# Patient Record
Sex: FEMALE | NOT HISPANIC OR LATINO | Employment: FULL TIME | URBAN - METROPOLITAN AREA
[De-identification: names, ages, dates, MRNs, and addresses within clinical notes are randomized per-mention and may not be internally consistent; named-entity substitution may affect disease eponyms.]

---

## 2017-01-20 ENCOUNTER — OFFICE VISIT (OUTPATIENT)
Dept: OBGYN | Facility: CLINIC | Age: 42
End: 2017-01-20
Payer: COMMERCIAL

## 2017-01-20 VITALS
SYSTOLIC BLOOD PRESSURE: 116 MMHG | HEIGHT: 61 IN | HEART RATE: 96 BPM | WEIGHT: 143 LBS | BODY MASS INDEX: 27 KG/M2 | OXYGEN SATURATION: 97 % | DIASTOLIC BLOOD PRESSURE: 74 MMHG

## 2017-01-20 DIAGNOSIS — Z98.890 POSTOPERATIVE STATE: Primary | ICD-10-CM

## 2017-01-20 PROCEDURE — 99024 POSTOP FOLLOW-UP VISIT: CPT | Performed by: OBSTETRICS & GYNECOLOGY

## 2017-01-20 NOTE — NURSING NOTE
Faxed return to work form to [employer]. Copy saved for patient.Copy sent to omer.  TK Horowitz 1/20/2017

## 2017-01-20 NOTE — NURSING NOTE
"Chief Complaint   Patient presents with     Surgical Followup     6 week post op       Initial /74 mmHg  Pulse 96  Ht 5' 1\" (1.549 m)  Wt 143 lb (64.864 kg)  BMI 27.03 kg/m2  SpO2 97%  LMP 11/10/2016  Breastfeeding? No Estimated body mass index is 27.03 kg/(m^2) as calculated from the following:    Height as of this encounter: 5' 1\" (1.549 m).    Weight as of this encounter: 143 lb (64.864 kg).  BP completed using cuff size: prachi Horowitz MA 1/20/2017         "

## 2017-01-22 ENCOUNTER — TELEPHONE (OUTPATIENT)
Dept: NURSING | Facility: CLINIC | Age: 42
End: 2017-01-22

## 2017-01-22 NOTE — TELEPHONE ENCOUNTER
"Call Type: Triage Call    Presenting Problem: \" I had a hysterectomy  with Dr. Maria and had  a followup appointment. I am having vaginal bleeding. He said to  page him if I have bleeding.  I also am having a stomach virus, with  fever, and diarrhea. \" Paged Dr. Shruthi Torres for [facility]  at 11:55AM through [employer] directly to patient at [phone].  Triage Note:  Guideline Title: Postoperative Problems ; Vaginal Bleeding, Menopausal:  No Menopausal Hormone Therapy (MHT)  Recommended Disposition: Call Provider Immediately  Original Inclination: Wanted to speak with a nurse  Override Disposition:  Intended Action: Call PCP/HCP  Physician Contacted: Yes  More bleeding from site of instrumentation or procedure OR bleeding lasting longer  than defined in provider specified discharge information ?  YES  Signs of dehydration ? NO  Unconscious now ? NO  Abdominal bloating ? NO  New onset OR worsening bleeding from incision requiring pressure to control ? NO  Depression and no other symptoms ? NO  Severe breathing problems ? NO  Wound separation AND internal organs protrude through wound or surgical incision  (evisceration) ? NO  Hives /Urticaria /Rash ? NO  New or worsening signs and symptoms that may indicate shock ? NO  Neck lump/swelling ? NO  Coughing up large amount of obvious blood (not blood-streaked sputum) ? NO  Orthopedic hardware (metal plate, richard or screw) newly bulging under or through  skin ? NO  New seizure now or within last 6 hours ? NO  Continuous or heavy bleeding from operative site and NOT controlled with 10  minutes of steady pressure ? NO  Pain not relieved by pain medication when taken as directed ? NO  Passing red, black or tarry material from rectum AND onset of new signs and  symptoms of hypovolemia ? NO  Wearing cast or splint AND new or worsening pain, swelling, numbness, tingling,  coolness or change in color that is NOT improved by elevation for 30 minutes OR  not resolved " within 2 hours ? NO  Temperature of 101.5 F (38.6 C) or greater that has not responded to 24 hours of  home care measures ? NO  Vomiting red, bloody or coffee-ground material, more than streaks of blood or  scant amount (not following nosebleed within past day) ? NO  New onset severe pain and pale, discolored or cool below the surgical site  compared to the other extremity ? NO  Current or recent urinary tract instrumentation AND urinary tract symptoms OR no  urine flow ? NO  New or worsening breathing problems ? NO  Heavy vaginal bleeding (soaking 1 pad/tampon every hour for 2 hours or more) ? NO  Urinary tract symptoms AND any flank or low back pain ? NO  Chest discomfort associated with shortness of breath, sweating, odd heartbeats or  different heart rate, nausea, vomiting, lightheadedness, or fainting lasting 5 or  more minutes now or within the last hour ? NO  Chest pain spreading to the shoulders, neck, jaw, in one or both arms, stomach or  back lasting 5 or more minutes now or within the last hour. Pain is NOT  associated with taking a deep breath or a productive cough, movement, or touch to  a localized area. ? NO  Signs and symptoms of anaphylaxis ? NO  Questions or concerns about postoperative wound ? NO  Headache following spinal anesthesia AND not relieved by pain medication ? NO  Pressure, fullness, squeezing sensation or pain anywhere in the chest lasting 5 or  more minutes now or within the last hour. Pain is NOT associated with taking a  deep breath or a productive cough, movement, or touch to a localized area on the  chest. ? NO  Sudden onset of focal neurological changes (difficulty speaking, numbness,  weakness, paralysis, loss of coordination, or change in vision such as double  vision or loss of visual field) ? NO  Sudden onset of shortness of breath, chest pain and cough with blood tinged sputum  ? NO  No urination for 12 or more hours ? NO  Abdominal pain, any blood in vomitus or stool,  abdominal bloating, new fever or  other cautionary symptoms began after GI surgery or procedure and is lasting  longer than defined in provider specified discharge information. ? NO  Abdominal pain, any blood in vomitus or stool, abdominal bloating, new fever or  other cautionary symptoms began after GI surgery or procedure and is lasting  longer than defined in provider specified discharge information. ? NO  New onset of unbearable pain within last 24 hours ? NO  Any temperature elevation in an immunocompromised individual OR frail elderly with  signs of dehydration ? NO  Unable to retain food or fluids for 24 hours or more due to recurrent vomiting ? NO  Any other unexpected urinary symptoms following urinary tract or abdominal surgery  within timeframe specified by provider ? NO  Sexual Assault or Rape ? NO  Taking hormone replacement therapy ? NO  New or worsening signs and symptoms that may indicate shock ? NO  Profuse vaginal bleeding not contained by pads or tampons ? NO  Physician Instructions: Dr. Crystal Torres paged directly to  patient at [phone]  Care Advice: See another provider immediately if unable to talk with your  provider within 1 hour. Consider the closest urgent care or ED if an office  or clinic is not available. Another adult should drive.

## 2017-01-23 ENCOUNTER — OFFICE VISIT (OUTPATIENT)
Dept: FAMILY MEDICINE | Facility: CLINIC | Age: 42
End: 2017-01-23
Payer: COMMERCIAL

## 2017-01-23 VITALS
HEART RATE: 80 BPM | OXYGEN SATURATION: 99 % | WEIGHT: 143 LBS | DIASTOLIC BLOOD PRESSURE: 72 MMHG | BODY MASS INDEX: 27 KG/M2 | HEIGHT: 61 IN | TEMPERATURE: 97.3 F | SYSTOLIC BLOOD PRESSURE: 130 MMHG

## 2017-01-23 DIAGNOSIS — R11.10 VOMITING, INTRACTABILITY OF VOMITING NOT SPECIFIED, PRESENCE OF NAUSEA NOT SPECIFIED, UNSPECIFIED VOMITING TYPE: ICD-10-CM

## 2017-01-23 DIAGNOSIS — J98.01 ACUTE BRONCHOSPASM: ICD-10-CM

## 2017-01-23 DIAGNOSIS — R19.5 LOOSE STOOLS: ICD-10-CM

## 2017-01-23 DIAGNOSIS — R07.0 THROAT PAIN: ICD-10-CM

## 2017-01-23 DIAGNOSIS — R05.9 COUGH: Primary | ICD-10-CM

## 2017-01-23 LAB
FLUAV+FLUBV AG SPEC QL: NORMAL
FLUAV+FLUBV AG SPEC QL: NORMAL
SPECIMEN SOURCE: NORMAL

## 2017-01-23 PROCEDURE — 87804 INFLUENZA ASSAY W/OPTIC: CPT | Performed by: FAMILY MEDICINE

## 2017-01-23 PROCEDURE — 99214 OFFICE O/P EST MOD 30 MIN: CPT | Performed by: FAMILY MEDICINE

## 2017-01-23 RX ORDER — ONDANSETRON 8 MG/1
8 TABLET, FILM COATED ORAL EVERY 12 HOURS PRN
Qty: 9 TABLET | Refills: 0 | COMMUNITY
Start: 2017-01-23 | End: 2017-07-28

## 2017-01-23 RX ORDER — PREDNISONE 20 MG/1
40 TABLET ORAL DAILY
Qty: 10 TABLET | Refills: 0 | COMMUNITY
Start: 2017-01-23 | End: 2017-07-28

## 2017-01-23 RX ORDER — CODEINE PHOSPHATE AND GUAIFENESIN 10; 100 MG/5ML; MG/5ML
2 SOLUTION ORAL EVERY 6 HOURS PRN
Qty: 180 ML | Refills: 0 | Status: SHIPPED | OUTPATIENT
Start: 2017-01-23 | End: 2017-07-28

## 2017-01-23 NOTE — PROGRESS NOTES
SUBJECTIVE:                                                    Di Higgins is a 41 year old female who presents to clinic today for the following health issues:    ENT Symptoms             Symptoms: cc Present Absent Comment   Fever/Chills  x     Fatigue   x    Muscle Aches  x     Eye Irritation   x    Sneezing   x    Nasal Marc/Drg   x    Sinus Pressure/Pain   x    Loss of smell   x    Dental pain   x    Sore Throat  x     Swollen Glands   x    Ear Pain/Fullness   x    Cough  x     Wheeze  x     Chest Pain  x     Shortness of breath  x     Rash       Other         Symptom duration:  5 days   Symptom severity:  severe   Treatments tried:  tea,inhaler nebs,   Contacts:  daughter,chad and son       Diarrhea     Onset: Saturday     Description:   Consistency of stool: watery and loose  Blood in stool: no   Number of loose stools in past 24 hours: 8    Progression of Symptoms:  improving    Accompanying Signs & Symptoms:  Fever: no   Nausea or vomiting; YES  Abdominal pain: YES  Episodes of constipation: no   Weight loss: no    History:   Ill contacts: no   Recent use of antibiotics: no    Recent travels: no          Recent medication-new or changes(Rx or OTC): no     Precipitating factors:   no    Alleviating factors:   nothing       Therapies Tried and outcome:  none; Outcome: no    Cough, sore throat, vomiting and lower abdominal pain.  Vomits from cough. No fever has chills.  and daughter sick; treated in UC was treated for bronchitis.    Problem list and histories reviewed & adjusted, as indicated.  Additional history: as documented    Patient Active Problem List   Diagnosis     GERD (gastroesophageal reflux disease)     Seasonal allergic rhinitis     Allergic rhinitis due to animal dander     House dust mite allergy     Rhinitis, allergic to other allergen     Vitamin D deficiency     Moderate persistent asthma     CARDIOVASCULAR SCREENING; LDL GOAL LESS THAN 160     Diagnostic skin and  "sensitization tests     Nonintractable paroxysmal hemicrania     Past Surgical History   Procedure Laterality Date     Tubal ligation  1999     laparoscopic tubal     Hysterectomy, vaginal  12/09/2016     uterine leiomyoma, adenomyosis     Hysterectomy, pap no longer indicated  12/09/2016       Social History   Substance Use Topics     Smoking status: Former Smoker     Smokeless tobacco: Former User     Quit date: 01/02/1985     Alcohol Use: Yes      Comment: occasional     Family History   Problem Relation Age of Onset     Neurologic Disorder Sister      MS     Asthma Father          ROS:  Constitutional, cardiovascular, pulmonary and gu systems are negative, except as otherwise noted.    OBJECTIVE:                                                    /72 mmHg  Pulse 80  Temp(Src) 97.3  F (36.3  C)  Ht 5' 1\" (1.549 m)  Wt 143 lb (64.864 kg)  BMI 27.03 kg/m2  SpO2 99%  LMP 11/10/2016  Body mass index is 27.03 kg/(m^2).  GENERAL: sick looking, alert and in moderate distress  EYES: Eyes grossly normal to inspection, PERRL and conjunctivae and sclerae normal  HENT: ear canals and TM's normal, nose and mouth without ulcers or lesions  NECK: no adenopathy and thyroid normal to palpation  RESP: lungs clear to auscultation - no rales, rhonchi or wheezes  CV: regular rate and rhythm, no murmur, click or rub  ABDOMEN: soft, vague lower abdominal tenderness, no hepatosplenomegaly, no masses and bowel sounds normal  MS: no gross musculoskeletal defects noted, no edema   Lab:  Results for orders placed or performed in visit on 01/23/17   Influenza A/B antigen   Result Value Ref Range    Influenza A/B Agn Specimen Nasal     Influenza A  NEG     Negative   Test results must be correlated with clinical data. If necessary, results   should be confirmed by a molecular assay or viral culture.      Influenza B  NEG     Negative   Test results must be correlated with clinical data. If necessary, results   should be confirmed " by a molecular assay or viral culture.            ASSESSMENT/PLAN:                                                    (R05) Cough  (primary encounter diagnosis)  Comment: Flu like symptoms. Influenza negative. Symptomatic treatment.  Plan: Influenza A/B antigen, guaiFENesin-codeine         (ROBITUSSIN AC) 100-10 MG/5ML SOLN solution    (R11.10) Vomiting, intractability of vomiting not specified, presence of nausea not specified, unspecified vomiting type  Comment: Stomach flu. Good hydration.  Plan: Influenza A/B antigen    (R19.5) Loose stools  Comment: Viral enteritis  Plan: Hydration    (R07.0) Throat pain  Comment: Symptomatic treatment.  Plan: Phenol 1.4 % AERS          (J98.01) Acute bronchospasm  Comment: Asthma flare due to viral illness. Steroid burst  Plan: predniSONE (DELTASONE) 20 MG tablet       Call or return to clinic prn if these symptoms worsen or fail to improve as anticipated 72 hours.       Amilcar Acosta MD  [facility]

## 2017-01-23 NOTE — MR AVS SNAPSHOT
After Visit Summary   1/23/2017    Di Higgins    MRN: [id number]           Patient Information     Date Of Birth          1975        Visit Information        Provider Department      1/23/2017 7:40 AM Amilcar Acosta MD [facility]        Today's Diagnoses     Cough    -  1     Vomiting, intractability of vomiting not specified, presence of nausea not specified, unspecified vomiting type         Loose stools         Throat pain         Acute bronchospasm           Care Instructions    [facility]    If you have any questions regarding to your visit please contact your care team:       Team Purple:   Clinic Hours Telephone Number   [address] Dr.   7am-7pm  Monday - Thursday   7am-5pm  Fridays  [phone]  (Appointment scheduling available 24/7)    Questions about your Visit?   Team Line:  [phone]   Urgent Care - [place] - 11am-9pm Monday-Friday Saturday-Sunday- 9am-5pm   [place] - 5pm-9pm Monday-Friday Saturday-Sunday- 9am-5pm  [phone] - [facility]  [phone] - [place]       What options do I have for visits at the clinic other than the traditional office visit?  To expand how we care for you, many of our providers are utilizing electronic visits (e-visits) and telephone visits, when medically appropriate, for interactions with their patients rather than a visit in the clinic.   We also offer nurse visits for many medical concerns. Just like any other service, we will bill your insurance company for this type of visit based on time spent on the phone with your provider. Not all insurance companies cover these visits. Please check with your medical insurance if this type of visit is covered. You will be responsible for any charges that are not paid by your insurance.      E-visits via [employer]:  generally incur a $35.00 fee.  Telephone visits:  Time  spent on the phone: *charged based on time that is spent on the phone in increments of 10 minutes. Estimated cost:   5-10 mins $30.00   11-20 mins. $59.00   21-30 mins. $85.00     Use [employer] (secure email communication and access to your chart) to send your primary care provider a message or make an appointment. Ask someone on your Team how to sign up for [employer].  For a Price Quote for your services, please call our [employer] Line at [phone].  As always, Thank you for trusting us with your health care needs!    Discharged By: An          Follow-ups after your visit        Follow-up notes from your care team     Return in about 1 day (around 1/24/2017), or if symptoms worsen or fail to improve.      Who to contact     If you have questions or need follow up information about today's clinic visit or your schedule please contact [facility] directly at [phone].  Normal or non-critical lab and imaging results will be communicated to you by [employer], letter or phone within 4 business days after the clinic has received the results. If you do not hear from us within 7 days, please contact the clinic through [employer] or phone. If you have a critical or abnormal lab result, we will notify you by phone as soon as possible.  Submit refill requests through [employer] or call your pharmacy and they will forward the refill request to us. Please allow 3 business days for your refill to be completed.          Additional Information About Your Visit        [employer] Information     [employer] gives you secure access to your electronic health record. If you see a primary care provider, you can also send messages to your care team and make appointments. If you have questions, please call your primary care clinic.  If you do not have a primary care provider, please call [phone] and they will assist you.        Care EveryWhere ID     This is your Care EveryWhere ID. This could be used by other organizations to  "access your [facility] medical records  [id number]        Your Vitals Were     Pulse Temperature Height BMI (Body Mass Index) Pulse Oximetry Last Period    80 97.3  F (36.3  C) 5' 1\" (1.549 m) 27.03 kg/m2 99% 11/10/2016       Blood Pressure from Last 3 Encounters:   01/23/17 130/72   01/20/17 116/74   12/30/16 137/86    Weight from Last 3 Encounters:   01/23/17 143 lb (64.864 kg)   01/20/17 143 lb (64.864 kg)   12/30/16 144 lb 3.2 oz (65.409 kg)              We Performed the Following     Influenza A/B antigen          Today's Medication Changes          These changes are accurate as of: 1/23/17  9:20 AM.  If you have any questions, ask your nurse or doctor.               Start taking these medicines.        Dose/Directions    guaiFENesin-codeine 100-10 MG/5ML Soln solution   Commonly known as:  ROBITUSSIN AC   Used for:  Cough   Started by:  Amilcar Acosta MD        Dose:  2 tsp.   Take 10 mLs by mouth every 6 hours as needed for cough   Quantity:  180 mL   Refills:  0            Where to get your medicines      Some of these will need a paper prescription and others can be bought over the counter.  Ask your nurse if you have questions.     Bring a paper prescription for each of these medications    - guaiFENesin-codeine 100-10 MG/5ML Soln solution             Primary Care Provider Office Phone # Fax #    Amilcar Acosta [phone]       [facility] [address] [place] [id number]        Thank you!     Thank you for choosing [facility]  for your care. Our goal is always to provide you with excellent care. Hearing back from our patients is one way we can continue to improve our services. Please take a few minutes to complete the written survey that you may receive in the mail after your visit with us. Thank you!             Your Updated Medication List - Protect others around you: Learn how to safely use, store and throw away your medicines at " www.disposemymeds.org.          This list is accurate as of: 1/23/17  9:20 AM.  Always use your most recent med list.                   Brand Name Dispense Instructions for use    * albuterol (2.5 MG/3ML) 0.083% neb solution     1 Box    Take 1 vial (2.5 mg) by nebulization every 6 hours as needed for shortness of breath / dyspnea or wheezing       * albuterol 108 (90 BASE) MCG/ACT Inhaler    VENTOLIN HFA    2 Inhaler    Inhale 2 puffs into the lungs every 4 hours as needed       guaiFENesin-codeine 100-10 MG/5ML Soln solution    ROBITUSSIN AC    180 mL    Take 10 mLs by mouth every 6 hours as needed for cough       ibuprofen 600 MG tablet    ADVIL/MOTRIN    30 tablet    Take 1 tablet (600 mg) by mouth every 6 hours as needed for moderate pain       * ondansetron 8 MG tablet    ZOFRAN    12 tablet    Take 1 tablet (8 mg) by mouth every 8 hours as needed for nausea       * ondansetron 8 MG tablet    ZOFRAN    9 tablet    Take 1 tablet (8 mg) by mouth every 12 hours as needed for nausea       order for DME     1 Device    Equipment being ordered: Nebulizer       Phenol 1.4 % Aers     1 Bottle    Take 2 sprays by mouth 2 times daily       * predniSONE 20 MG tablet    DELTASONE    20 tablet    Take 3 tabs (60 mg) orally daily for 3 days, 2 tabs (40 mg) orally daily for 3 days, 1 tab (20 mg) orally daily for 3 days, then 1/2 tab (10 mg) orally for 3 days       * predniSONE 20 MG tablet    DELTASONE    10 tablet    Take 2 tablets (40 mg) by mouth daily       SINUS RINSE BOTTLE KIT NA      Spray 1 Bottle in nostril daily as needed.       SUMAtriptan 50 MG tablet    IMITREX    10 tablet    Take 1 tablet (50 mg) by mouth at onset of headache for migraine May repeat in 2 hours if needed: max 2/day       * Notice:  This list has 6 medication(s) that are the same as other medications prescribed for you. Read the directions carefully, and ask your doctor or other care provider to review them with you.

## 2017-01-23 NOTE — NURSING NOTE
"Chief Complaint   Patient presents with     URI     Diarrhea       Initial /72 mmHg  Pulse 80  Temp(Src) 97.3  F (36.3  C)  Ht 5' 1\" (1.549 m)  Wt 143 lb (64.864 kg)  BMI 27.03 kg/m2  SpO2 99%  LMP 11/10/2016 Estimated body mass index is 27.03 kg/(m^2) as calculated from the following:    Height as of this encounter: 5' 1\" (1.549 m).    Weight as of this encounter: 143 lb (64.864 kg).  BP completed using cuff size: prachi Sanchez MA      "

## 2017-01-23 NOTE — PROGRESS NOTES
"Di is a 41 year old , She is 6 weeks s/p TVH.  Her postop recovery has been uncomplicated.  She is currently requiring no medications for pain management.  no vaginal bleeding, no hot flashes. Energy level is normal.  Denies fever, chills.    The pathology report showed:   Uterus and cervix with bilateral fallopian tubes, hysterectomy  and salpingectomy - Adenomyosis and leiomyomata.    The medical history, social history and family history have been reviewed and updated as indicated.      ROS:   ROS:4 point ROS including Respiratory, CV, GI and , other than that noted in the HPI and the PMH, is negative     PE: /74 mmHg  Pulse 96  Ht 5' 1\" (1.549 m)  Wt 143 lb (64.864 kg)  BMI 27.03 kg/m2  SpO2 97%  LMP 11/10/2016  Breastfeeding? No  HEENT:  NC/AT, EOMI  Abd: soft, non tender, no masses  Vulva:  No masses noted  Vagina: cuff healing, some sutures still seen  BME: no masses    ASSESSMENT:  Post op TVH    PLAN:  Return to routine care    Aquiles Maria MD        "

## 2017-01-23 NOTE — PATIENT INSTRUCTIONS
[facility]    If you have any questions regarding to your visit please contact your care team:       Team Purple:   Clinic Hours Telephone Number   [address] Dr.   7am-7pm  Monday - Thursday   7am-5pm  Fridays  [phone]  (Appointment scheduling available 24/7)    Questions about your Visit?   Team Line:  [phone]   Urgent Care - [place] and [facility] - 11am-9pm Monday-Friday Saturday-Sunday- 9am-5pm   [place] - 5pm-9pm Monday-Friday Saturday-Sunday- 9am-5pm  [phone] - [facility]  [phone] - [place]       What options do I have for visits at the clinic other than the traditional office visit?  To expand how we care for you, many of our providers are utilizing electronic visits (e-visits) and telephone visits, when medically appropriate, for interactions with their patients rather than a visit in the clinic.   We also offer nurse visits for many medical concerns. Just like any other service, we will bill your insurance company for this type of visit based on time spent on the phone with your provider. Not all insurance companies cover these visits. Please check with your medical insurance if this type of visit is covered. You will be responsible for any charges that are not paid by your insurance.      E-visits via [employer]:  generally incur a $35.00 fee.  Telephone visits:  Time spent on the phone: *charged based on time that is spent on the phone in increments of 10 minutes. Estimated cost:   5-10 mins $30.00   11-20 mins. $59.00   21-30 mins. $85.00     Use [employer] (secure email communication and access to your chart) to send your primary care provider a message or make an appointment. Ask someone on your Team how to sign up for [employer].  For a Price Quote for your services, please call our Consumer Price Line at [phone].  As always, Thank you for trusting us with your health care  needs!    Discharged By: An

## 2017-01-24 ASSESSMENT — ASTHMA QUESTIONNAIRES: ACT_TOTALSCORE: 15

## 2017-02-17 ENCOUNTER — TELEPHONE (OUTPATIENT)
Dept: OBGYN | Facility: CLINIC | Age: 42
End: 2017-02-17

## 2017-02-17 NOTE — TELEPHONE ENCOUNTER
[place] [facility] [place]:  Date of Procedure:  12/9/2016  Transvaginal hysterectomy  Aquiles Maria MD    Phone call to pt. She c/o pain near her tailbone. She c/o intermittent, sometimes dull, sometimes sharp, various time of day. Pt stated unpredictable when she will have pain. Sometimes when she is lying down, sometimes when getting out of bed in the morning, sometimes sitting for long periods of time.  No known injury or fall. She stated she did discuss with Dr. Maria at post-op appt and he told her to monitor sx's. Pt stated she has tried Epsom salts baths. Sometimes pain is 7.5/10. IBP does relieve pain. Explained not sure it is related to surgery. Explained she may need to be seen by family practice. Pt requested a msg to be sent to Dr. Maria to consult and she will see family practice if needed.  Explained Dr. Maria is not in clinic anymore today so she will not get a reply until next week. Pt agreed to wait for response.     Will route to Dr. Maria for review & orders. Miriam Christina RN, BAN

## 2017-02-17 NOTE — TELEPHONE ENCOUNTER
Reason for call:  Patient reporting a symptom    Symptom or request: Lower back pain    Duration (how long have symptoms been present): Since surgery    Have you been treated for this before? No    Additional comments: Has tried at home soaking, heating and ibuprofen and is not helping.    Phone Number patient can be reached at:  Home number on file [phone] (home)    Best Time:  any    Can we leave a detailed message on this number:  YES    Call taken on 2/17/2017 at 1:11 PM by Seema Keating

## 2017-02-21 NOTE — TELEPHONE ENCOUNTER
This writer attempted to contact gloria on 02/21/17.    Was call answered?  No.  Left message on voicemail with orders that Dr. Maria does want pt to be seen by FP. Requested she call back so that we know that she received the msg.  me back.    If patient calls back, please Contact Clinic RN team. If no one available, send encounter message    Miriam Christina RN, BAN

## 2017-04-06 ENCOUNTER — TELEPHONE (OUTPATIENT)
Dept: FAMILY MEDICINE | Facility: CLINIC | Age: 42
End: 2017-04-06

## 2017-04-06 NOTE — TELEPHONE ENCOUNTER
Patient would like to discuss the recent recall of the Ventolin inhalers. Would like to know if she needs to stop using this and what would be the next step you advise.   Alexus Vasquez RN

## 2017-04-06 NOTE — TELEPHONE ENCOUNTER
Reason for Call:  Other prescription    Detailed comments: Patient would like to discuss the recent recall of the Ventolin inhalers. Would like to know if she needs to stop using this and what would be the next step you advise. Please call.    Phone Number Patient can be reached at: Home number on file [phone] (home)    Best Time: any    Can we leave a detailed message on this number? YES    Call taken on 4/6/2017 at 3:21 PM by Seema Keating

## 2017-04-07 NOTE — TELEPHONE ENCOUNTER
"  This is the statement from the  about the recall: (if affected due to leakage might not be as effective).    [employer] ([employer]) issued a voluntary Class II recall on April 4, 2017 for three lots of VentolinÂ  HFA 200D Inhalers from [place] hospitals, pharmacies, retailers and wholesalers as a precautionary measure. It is not a consumer recall.The lot numbers are:  - [id number]  - [id number]  - [id number]  A [employer] spokesman said the recall is due to some complaints about the overwrap, or pouches containing the inhalers, becoming inflated by leaking from the product.   The leaking may cause the inhaler to deliver fewer doses than shown on the dose counter.   Patients are not being told to return the inhalers.    Note: A Class II recall is one where \"use of, or exposure to, a violative product may cause temporary or medically reversible adverse health consequences or where the probability of serious adverse health consequences is remote,\" according to the U.S. Food and Drug Administration.  "

## 2017-04-07 NOTE — TELEPHONE ENCOUNTER
Patient notified of providers message as written.  Patient verbalized understanding, no further questions or concerns.    Katey Lr RN

## 2017-06-20 ENCOUNTER — MYC MEDICAL ADVICE (OUTPATIENT)
Dept: FAMILY MEDICINE | Facility: CLINIC | Age: 42
End: 2017-06-20

## 2017-06-21 DIAGNOSIS — J45.41 MODERATE PERSISTENT ASTHMA WITH ACUTE EXACERBATION: Primary | ICD-10-CM

## 2017-06-21 NOTE — TELEPHONE ENCOUNTER
Discussed doing nebs as needed and will need a controller medication.  Since referred to allergist will defer prescription for controller medication until seen.

## 2017-06-23 ENCOUNTER — TELEPHONE (OUTPATIENT)
Dept: FAMILY MEDICINE | Facility: CLINIC | Age: 42
End: 2017-06-23

## 2017-06-23 NOTE — TELEPHONE ENCOUNTER
REASON FOR MESSAGE  Patient is requesting:        FORMS      Type of form needing completion:  FMLA    Date form needed:  soon    Once complete:  Fax form to:  [phone]    Has patient seen provider for appointment related to diagnosis or condition in form?    Amira Owen,

## 2017-06-26 NOTE — TELEPHONE ENCOUNTER
[facility] paperwork faxed to [phone].  Originals given to patient. Morelia Ryan,

## 2017-07-25 ENCOUNTER — TELEPHONE (OUTPATIENT)
Dept: FAMILY MEDICINE | Facility: CLINIC | Age: 42
End: 2017-07-25

## 2017-07-25 NOTE — TELEPHONE ENCOUNTER
Panel Management Review      Patient has the following on her problem list:     Asthma review     ACT Total Scores 1/23/2017   ACT TOTAL SCORE -   ASTHMA ER VISITS -   ASTHMA HOSPITALIZATIONS -   ACT TOTAL SCORE (Goal Greater than or Equal to 20) 15   In the past 12 months, how many times did you visit the emergency room for your asthma without being admitted to the hospital? 0   In the past 12 months, how many times were you hospitalized overnight because of your asthma? 0      1. Is Asthma diagnosis on the Problem List? Yes    2. Is Asthma listed on Health Maintenance? Yes    3. Patient is due for:  ACT        Composite cancer screening  Chart review shows that this patient is due/due soon for the following None  Summary:    Patient is due/failing the following:   ACT    Action needed:   Patient needs office visit for Asthma Recheck and Patient needs to do ACT.    Type of outreach:    Sent letter.    Questions for provider review:    None                                                                                                                                    Geovanna Olivares MA       Chart routed to none .

## 2017-07-25 NOTE — LETTER
[address] [place] [place]  [place] [address]  Phone: [phone]            July 25, 2017          Di Higgins,  [address]  [place] [address]        Dear Di Higgins      Monitoring and managing your preventative and chronic health conditions are very important to us. Our records indicate that you have not scheduled for Asthma Check and Asthma Control Test  which was recommended by Dr. Acosta.      If you have received your health care elsewhere, please call the clinic so the information can be documented in your chart.    Please call [phone] or message us through your [employer] account to schedule an appointment or provide information for your chart.     Feel free to contact us if you have any questions or concerns!    I look forward to seeing you and working with you on your health care needs.     Sincerely,         Amilcar Acosta / ela

## 2017-07-26 ENCOUNTER — TELEPHONE (OUTPATIENT)
Dept: FAMILY MEDICINE | Facility: CLINIC | Age: 42
End: 2017-07-26

## 2017-07-26 DIAGNOSIS — J45.40 MODERATE PERSISTENT ASTHMA, UNCOMPLICATED: ICD-10-CM

## 2017-07-26 RX ORDER — ALBUTEROL SULFATE 90 UG/1
2 AEROSOL, METERED RESPIRATORY (INHALATION) EVERY 4 HOURS PRN
Qty: 2 INHALER | Refills: 0 | Status: SHIPPED | OUTPATIENT
Start: 2017-07-26 | End: 2018-04-13

## 2017-07-26 NOTE — TELEPHONE ENCOUNTER
albuterol (VENTOLIN HFA) 108 (90 BASE) MCG/ACT inhaler      Last Written Prescription Date: 11/30/2016  Last Fill Quantity: 2, # refills: 3    Last Office Visit with G, P or [facility] prescribing provider:  01/23/2017   Future Office Visit:    Next 5 appointments (look out 90 days)     Jul 28, 2017  7:40 AM CDT   Office Visit with Amilcar Acosta MD   [facility]    [address] [facility] [address]   [phone]                   Date of Last Asthma Action Plan Letter:   Asthma Action Plan Q1 Year    Topic Date Due     Asthma Action Plan - yearly  11/29/2017      Asthma Control Test:   ACT Total Scores 1/23/2017   ACT TOTAL SCORE -   ASTHMA ER VISITS -   ASTHMA HOSPITALIZATIONS -   ACT TOTAL SCORE (Goal Greater than or Equal to 20) 15   In the past 12 months, how many times did you visit the emergency room for your asthma without being admitted to the hospital? 0   In the past 12 months, how many times were you hospitalized overnight because of your asthma? 0       Date of Last Spirometry Test:   No results found for this or any previous visit.      Paige Hoyt MA

## 2017-07-26 NOTE — PROGRESS NOTES
SUBJECTIVE:                                                    Di Higgins is a 41 year old female with moderate severe asthma and seasonal allergies who presents to clinic today for the following health issues:    Asthma Follow-Up    Was ACT completed today?    Yes    ACT Total Scores 7/28/2017   ACT TOTAL SCORE -   ASTHMA ER VISITS -   ASTHMA HOSPITALIZATIONS -   ACT TOTAL SCORE (Goal Greater than or Equal to 20) 13   In the past 12 months, how many times did you visit the emergency room for your asthma without being admitted to the hospital? 0   In the past 12 months, how many times were you hospitalized overnight because of your asthma? 0       Allergies:    Recent asthma triggers that patient is dealing with: humidity, emotions and allergies and some constipation         Amount of exercise or physical activity: 6-7 days/week for an average of 30-45 minutes    Problems taking medications regularly: No    Medication side effects: mynor from nebs  Diet: regular (no restrictions)    Problem list and histories reviewed & adjusted, as indicated.  Additional history: as documented    Patient Active Problem List   Diagnosis     GERD (gastroesophageal reflux disease)     Seasonal allergic rhinitis     Allergic rhinitis due to animal dander     House dust mite allergy     Rhinitis, allergic to other allergen     Vitamin D deficiency     Moderate persistent asthma     CARDIOVASCULAR SCREENING; LDL GOAL LESS THAN 160     Diagnostic skin and sensitization tests     Nonintractable paroxysmal hemicrania     Past Surgical History:   Procedure Laterality Date     HYSTERECTOMY, PAP NO LONGER INDICATED  12/09/2016     HYSTERECTOMY, VAGINAL  12/09/2016    uterine leiomyoma, adenomyosis     TUBAL LIGATION  1999    laparoscopic tubal       Social History   Substance Use Topics     Smoking status: Former Smoker     Smokeless tobacco: Former User     Quit date: 1/2/1985     Alcohol use Yes      Comment: occasional     Family  "History   Problem Relation Age of Onset     Asthma Father      Neurologic Disorder Sister      MS         Reviewed and updated as needed this visit by clinical staffTobacco  Allergies  Meds  Med Hx  Surg Hx  Fam Hx  Soc Hx      Reviewed and updated as needed this visit by Provider    ROS:  Constitutional, HEENT, cardiovascular, gi and gu systems are negative, except as otherwise noted.    OBJECTIVE:   /80  Pulse 91  Temp 97.9  F (36.6  C) (Oral)  Resp 14  Ht 5' 1\" (1.549 m)  Wt 137 lb (62.1 kg)  LMP 11/10/2016  SpO2 99%  Breastfeeding? No  BMI 25.89 kg/m2  Body mass index is 25.89 kg/(m^2).  GENERAL: healthy, alert and no distress  NECK: no adenopathy and thyroid normal to palpation  RESP: lungs clear to auscultation - no rales, rhonchi or wheezes  CV: regular rate and rhythm, no murmur, click or rub, no peripheral edema   Diagnostic Test Results:    ASSESSMENT/PLAN:     (J45.40) Moderate persistent asthma without complication  (primary encounter diagnosis)  Comment: ACT score 13. Not well controlled. Dealing with allergies as well. She used to do Advair as recommended by allergist but stopped using at some point due to concern about side effects. Given the severity and frequency of symptoms discussed restarting a controller medication. Avoidance of allergens and regular use of anti allergy medication encouraged.  Plan: fluticasone-salmeterol (ADVAIR) 250-50 MCG/DOSE        diskus inhaler, albuterol (PROAIR HFA/PROVENTIL        HFA/VENTOLIN HFA) 108 (90 BASE) MCG/ACT Inhaler    (J30.81) Allergic rhinitis due to animal dander  Comment: Discussed allergy treatment; been doing Claritin, discussed trying Singulair  Plan: fluticasone-salmeterol (ADVAIR) 250-50 MCG/DOSE        diskus inhaler, montelukast (SINGULAIR) 10 MG         tablet          Follow up in 1 month or sooner with concerns    Amilcar Acosta MD  [facility]  "

## 2017-07-26 NOTE — TELEPHONE ENCOUNTER
Reason for Call:  Other prescription    Detailed comments: Patient is requesting refill on her emergency asthma inhaler. Please call.    Phone Number Patient can be reached at: Home number on file [phone] (home)    Best Time: any    Can we leave a detailed message on this number? YES    Call taken on 7/26/2017 at 9:32 AM by Seema Keating

## 2017-07-26 NOTE — TELEPHONE ENCOUNTER
Patient is overdue for ACT/asthma follow up; needs to complete for refills  Will do one time prescription without refills

## 2017-07-26 NOTE — TELEPHONE ENCOUNTER
Patient scheduled appointment on Friday, July 28,2017 @ 7:40 AM with Dr. Acosta for Asthma follow up.  Geovanna Olivares MA

## 2017-07-26 NOTE — TELEPHONE ENCOUNTER
Routing refill request to provider for review/approval because:  It has been 6 months since last visit, ACT score of 15    Katey Lr RN

## 2017-07-28 ENCOUNTER — OFFICE VISIT (OUTPATIENT)
Dept: FAMILY MEDICINE | Facility: CLINIC | Age: 42
End: 2017-07-28
Payer: COMMERCIAL

## 2017-07-28 VITALS
HEART RATE: 91 BPM | HEIGHT: 61 IN | SYSTOLIC BLOOD PRESSURE: 122 MMHG | OXYGEN SATURATION: 99 % | BODY MASS INDEX: 25.86 KG/M2 | TEMPERATURE: 97.9 F | RESPIRATION RATE: 14 BRPM | WEIGHT: 137 LBS | DIASTOLIC BLOOD PRESSURE: 80 MMHG

## 2017-07-28 DIAGNOSIS — J30.81 ALLERGIC RHINITIS DUE TO ANIMAL DANDER: ICD-10-CM

## 2017-07-28 DIAGNOSIS — J45.40 MODERATE PERSISTENT ASTHMA WITHOUT COMPLICATION: Primary | ICD-10-CM

## 2017-07-28 PROCEDURE — 99214 OFFICE O/P EST MOD 30 MIN: CPT | Performed by: FAMILY MEDICINE

## 2017-07-28 RX ORDER — MONTELUKAST SODIUM 10 MG/1
10 TABLET ORAL AT BEDTIME
Qty: 90 TABLET | Refills: 1 | Status: SHIPPED | OUTPATIENT
Start: 2017-07-28 | End: 2019-05-14

## 2017-07-28 RX ORDER — ALBUTEROL SULFATE 90 UG/1
2 AEROSOL, METERED RESPIRATORY (INHALATION) EVERY 6 HOURS PRN
Qty: 2 INHALER | Refills: 3 | Status: SHIPPED | OUTPATIENT
Start: 2017-07-28 | End: 2017-08-07

## 2017-07-28 ASSESSMENT — PAIN SCALES - GENERAL: PAINLEVEL: NO PAIN (0)

## 2017-07-28 NOTE — NURSING NOTE
"Chief Complaint   Patient presents with     Asthma     Health Maintenance     ACT,PHQ2       Initial /80  Pulse 91  Temp 97.9  F (36.6  C) (Oral)  Resp 14  Ht 5' 1\" (1.549 m)  Wt 137 lb (62.1 kg)  LMP 11/10/2016  SpO2 99%  Breastfeeding? No  BMI 25.89 kg/m2 Estimated body mass index is 25.89 kg/(m^2) as calculated from the following:    Height as of this encounter: 5' 1\" (1.549 m).    Weight as of this encounter: 137 lb (62.1 kg).  Medication Reconciliation: complete   Noemy Calle CMA (AAMA)      "

## 2017-07-28 NOTE — PATIENT INSTRUCTIONS
[facility]    If you have any questions regarding to your visit please contact your care team:       Team Purple:   Clinic Hours Telephone Number   Dr. Hellen Mendez     7am-7pm  Monday - Thursday   7am-5pm  Fridays  [phone]  (Appointment scheduling available 24/7)    Questions about your Visit?   Team Line:  [phone]   Urgent Care - [place] and [facility] - 11am-9pm Monday-Friday Saturday-Sunday- 9am-5pm   [place] - 5pm-9pm Monday-Friday Saturday-Sunday- 9am-5pm  [phone] - [facility]  [phone] - [place]       What options do I have for visits at the clinic other than the traditional office visit?  To expand how we care for you, many of our providers are utilizing electronic visits (e-visits) and telephone visits, when medically appropriate, for interactions with their patients rather than a visit in the clinic.   We also offer nurse visits for many medical concerns. Just like any other service, we will bill your insurance company for this type of visit based on time spent on the phone with your provider. Not all insurance companies cover these visits. Please check with your medical insurance if this type of visit is covered. You will be responsible for any charges that are not paid by your insurance.      E-visits via [employer]:  generally incur a $35.00 fee.  Telephone visits:  Time spent on the phone: *charged based on time that is spent on the phone in increments of 10 minutes. Estimated cost:   5-10 mins $30.00   11-20 mins. $59.00   21-30 mins. $85.00     Use [employer] (secure email communication and access to your chart) to send your primary care provider a message or make an appointment. Ask someone on your Team how to sign up for [employer].  For a Price Quote for your services, please call our Consumer Price Line at [phone].  As always, Thank you for trusting us with your health care needs!    Discharged by Maikol  CMA

## 2017-07-28 NOTE — MR AVS SNAPSHOT
After Visit Summary   7/28/2017    Di Higgins    MRN: [id number]           Patient Information     Date Of Birth          1975        Visit Information        Provider Department      7/28/2017 7:40 AM Amilcar Acosta MD [facility]        Today's Diagnoses     Moderate persistent asthma without complication    -  1    Allergic rhinitis due to animal dander          Care Instructions    [facility]    If you have any questions regarding to your visit please contact your care team:       Team Purple:   Clinic Hours Telephone Number   Dr. Hellen Mendez     7am-7pm  Monday - Thursday   7am-5pm  Fridays  [phone]  (Appointment scheduling available 24/7)    Questions about your Visit?   Team Line:  [phone]   Urgent Care - [place] - 11am-9pm Monday-Friday Saturday-Sunday- 9am-5pm   [place] - 5pm-9pm Monday-Friday Saturday-Sunday- 9am-5pm  [phone] - [facility]  [phone] - [place]       What options do I have for visits at the clinic other than the traditional office visit?  To expand how we care for you, many of our providers are utilizing electronic visits (e-visits) and telephone visits, when medically appropriate, for interactions with their patients rather than a visit in the clinic.   We also offer nurse visits for many medical concerns. Just like any other service, we will bill your insurance company for this type of visit based on time spent on the phone with your provider. Not all insurance companies cover these visits. Please check with your medical insurance if this type of visit is covered. You will be responsible for any charges that are not paid by your insurance.      E-visits via [employer]:  generally incur a $35.00 fee.  Telephone visits:  Time spent on the phone: *charged based on time that is spent on the phone in increments of 10 minutes. Estimated cost:  "  5-10 mins $30.00   11-20 mins. $59.00   21-30 mins. $85.00     Use 2[employer] Onlinehart (secure email communication and access to your chart) to send your primary care provider a message or make an appointment. Ask someone on your Team how to sign up for [employer].  For a Price Quote for your services, please call our [employer] Line at [phone].  As always, Thank you for trusting us with your health care needs!    Discharged by RENO Lassiter            Follow-ups after your visit        Who to contact     If you have questions or need follow up information about today's clinic visit or your schedule please contact [facility] directly at [phone].  Normal or non-critical lab and imaging results will be communicated to you by [employer] Onlinehart, letter or phone within 4 business days after the clinic has received the results. If you do not hear from us within 7 days, please contact the clinic through [employer] or phone. If you have a critical or abnormal lab result, we will notify you by phone as soon as possible.  Submit refill requests through [employer] or call your pharmacy and they will forward the refill request to us. Please allow 3 business days for your refill to be completed.          Additional Information About Your Visit        2CODE OnlineharTeamleader Information     [employer] gives you secure access to your electronic health record. If you see a primary care provider, you can also send messages to your care team and make appointments. If you have questions, please call your primary care clinic.  If you do not have a primary care provider, please call [phone] and they will assist you.        Care EveryWhere ID     This is your Care EveryWhere ID. This could be used by other organizations to access your [place] medical records  [id number]        Your Vitals Were     Pulse Temperature Respirations Height Last Period Pulse Oximetry    91 97.9  F (36.6  C) (Oral) 14 5' 1\" (1.549 m) 11/10/2016 99%    Breastfeeding? BMI " (Body Mass Index)                No 25.89 kg/m2           Blood Pressure from Last 3 Encounters:   07/28/17 122/80   01/23/17 130/72   01/20/17 116/74    Weight from Last 3 Encounters:   07/28/17 137 lb (62.1 kg)   01/23/17 143 lb (64.9 kg)   01/20/17 143 lb (64.9 kg)              Today, you had the following     No orders found for display         Today's Medication Changes          These changes are accurate as of: 7/28/17  8:29 AM.  If you have any questions, ask your nurse or doctor.               Start taking these medicines.        Dose/Directions    fluticasone-salmeterol 250-50 MCG/DOSE diskus inhaler   Commonly known as:  ADVAIR   Used for:  Moderate persistent asthma without complication, Allergic rhinitis due to animal dander   Started by:  Amilcar Acosta MD        Dose:  1 puff   Inhale 1 puff into the lungs 2 times daily   Quantity:  3 Inhaler   Refills:  1       montelukast 10 MG tablet   Commonly known as:  SINGULAIR   Used for:  Allergic rhinitis due to animal dander   Started by:  Amilcar Acosta MD        Dose:  10 mg   Take 1 tablet (10 mg) by mouth At Bedtime   Quantity:  90 tablet   Refills:  1         These medicines have changed or have updated prescriptions.        Dose/Directions    * albuterol (2.5 MG/3ML) 0.083% neb solution   This may have changed:  Another medication with the same name was added. Make sure you understand how and when to take each.   Used for:  Asthma exacerbation   Changed by:  Valente Hogue PA-C        Dose:  1 vial   Take 1 vial (2.5 mg) by nebulization every 6 hours as needed for shortness of breath / dyspnea or wheezing   Quantity:  1 Box   Refills:  4       * albuterol 108 (90 BASE) MCG/ACT Inhaler   Commonly known as:  VENTOLIN HFA   This may have changed:  Another medication with the same name was added. Make sure you understand how and when to take each.   Used for:  Moderate persistent asthma, uncomplicated   Changed by:  Amilcar Acosta  MD Shawn        Dose:  2 puff   Inhale 2 puffs into the lungs every 4 hours as needed   Quantity:  2 Inhaler   Refills:  0       * albuterol 108 (90 BASE) MCG/ACT Inhaler   Commonly known as:  PROAIR HFA/PROVENTIL HFA/VENTOLIN HFA   This may have changed:  You were already taking a medication with the same name, and this prescription was added. Make sure you understand how and when to take each.   Used for:  Moderate persistent asthma without complication   Changed by:  Amilcar Acosta MD        Dose:  2 puff   Inhale 2 puffs into the lungs every 6 hours as needed for shortness of breath / dyspnea or wheezing   Quantity:  2 Inhaler   Refills:  3       * Notice:  This list has 3 medication(s) that are the same as other medications prescribed for you. Read the directions carefully, and ask your doctor or other care provider to review them with you.         Where to get your medicines      These medications were sent to [facility] [address] [place] [address]  [address] [facility] [address]    Hours:  24-hours Phone:  [phone]     albuterol 108 (90 BASE) MCG/ACT Inhaler    fluticasone-salmeterol 250-50 MCG/DOSE diskus inhaler    montelukast 10 MG tablet                Primary Care Provider Office Phone # Fax #    Amilcar Acosta [phone]       [place] [id number]        Equal Access to Services     FRANCIE [facility] HospitalJARETH : Hadii aad ku hadasho Soomaali, waaxda luqadaha, qaybta kaalmada adeegrené, nuha nayak . So [place] [phone].    ATENCIÓN: Si habla español, tiene a zheng disposición servicios gratuitos de asistencia lingüística. Llame al [phone].    We comply with applicable federal civil rights laws and [place] laws. We do not discriminate on the basis of race, color, national origin, age, disability sex, sexual orientation or gender  identity.            Thank you!     Thank you for choosing [facility]3] [place]  for your care. Our goal is always to provide you with excellent care. Hearing back from our patients is one way we can continue to improve our services. Please take a few minutes to complete the written survey that you may receive in the mail after your visit with us. Thank you!             Your Updated Medication List - Protect others around you: Learn how to safely use, store and throw away your medicines at www.disposemymeds.org.          This list is accurate as of: 7/28/17  8:29 AM.  Always use your most recent med list.                   Brand Name Dispense Instructions for use Diagnosis    * albuterol (2.5 MG/3ML) 0.083% neb solution     1 Box    Take 1 vial (2.5 mg) by nebulization every 6 hours as needed for shortness of breath / dyspnea or wheezing    Asthma exacerbation       * albuterol 108 (90 BASE) MCG/ACT Inhaler    VENTOLIN HFA    2 Inhaler    Inhale 2 puffs into the lungs every 4 hours as needed    Moderate persistent asthma, uncomplicated       * albuterol 108 (90 BASE) MCG/ACT Inhaler    PROAIR HFA/PROVENTIL HFA/VENTOLIN HFA    2 Inhaler    Inhale 2 puffs into the lungs every 6 hours as needed for shortness of breath / dyspnea or wheezing    Moderate persistent asthma without complication       fluticasone-salmeterol 250-50 MCG/DOSE diskus inhaler    ADVAIR    3 Inhaler    Inhale 1 puff into the lungs 2 times daily    Moderate persistent asthma without complication, Allergic rhinitis due to animal dander       ibuprofen 600 MG tablet    ADVIL/MOTRIN    30 tablet    Take 1 tablet (600 mg) by mouth every 6 hours as needed for moderate pain    Neck pain       montelukast 10 MG tablet    SINGULAIR    90 tablet    Take 1 tablet (10 mg) by mouth At Bedtime    Allergic rhinitis due to animal dander       ondansetron 8 MG tablet    ZOFRAN    12 tablet    Take 1 tablet (8 mg) by mouth every 8 hours as needed for nausea     Migraine without status migrainosus, not intractable, unspecified migraine type       order for DME     1 Device    Equipment being ordered: Nebulizer    Moderate persistent asthma with acute exacerbation       predniSONE 20 MG tablet    DELTASONE    20 tablet    Take 3 tabs (60 mg) orally daily for 3 days, 2 tabs (40 mg) orally daily for 3 days, 1 tab (20 mg) orally daily for 3 days, then 1/2 tab (10 mg) orally for 3 days    Moderate persistent asthma with acute exacerbation       SINUS RINSE BOTTLE KIT NA      Spray 1 Bottle in nostril daily as needed.    Seasonal allergic rhinitis       SUMAtriptan 50 MG tablet    IMITREX    10 tablet    Take 1 tablet (50 mg) by mouth at onset of headache for migraine May repeat in 2 hours if needed: max 2/day    Migraine without status migrainosus, not intractable, unspecified migraine type       * Notice:  This list has 3 medication(s) that are the same as other medications prescribed for you. Read the directions carefully, and ask your doctor or other care provider to review them with you.

## 2017-07-29 ASSESSMENT — ASTHMA QUESTIONNAIRES: ACT_TOTALSCORE: 13

## 2017-07-31 ENCOUNTER — TELEPHONE (OUTPATIENT)
Dept: FAMILY MEDICINE | Facility: CLINIC | Age: 42
End: 2017-07-31

## 2017-07-31 NOTE — TELEPHONE ENCOUNTER
Patient was in to see Dr. Acosta on 07-28-17 and failed their ACT by scoring 13. Please put in the failed ACT workflow for follow-up. Thank you.

## 2017-07-31 NOTE — LETTER
[facility]  [address]  [place] [address]  [phone]    August 28, 2017      Di Higgins  [address]  [place] [address]      Dear Di,     Your clinic record indicates that you are due for an asthma update. We have a survey tool called an ACT (or Asthma Control Test) we use to measure the level of control of your asthma.     Please complete the enclosed questionnaire and mail it back to us in the self-addressed stamped envelope.     If you have questions about this letter please contact your provider.     Sincerely,      Your [facility]  Enclosure

## 2017-08-07 ENCOUNTER — TELEPHONE (OUTPATIENT)
Dept: FAMILY MEDICINE | Facility: CLINIC | Age: 42
End: 2017-08-07

## 2017-08-07 DIAGNOSIS — J45.40 MODERATE PERSISTENT ASTHMA WITHOUT COMPLICATION: Primary | ICD-10-CM

## 2017-08-07 RX ORDER — ALBUTEROL SULFATE 90 UG/1
2 AEROSOL, METERED RESPIRATORY (INHALATION) EVERY 4 HOURS PRN
Qty: 2 INHALER | Refills: 3 | Status: SHIPPED | OUTPATIENT
Start: 2017-08-07 | End: 2018-04-13

## 2017-08-07 NOTE — TELEPHONE ENCOUNTER
Regarding Albuterol inhaler: Spoke with pharmacy insurance does not cover 2 inhalers a month because this is a 50 day supply.  Some times insurance will cover a 90 day supply.  Per pharmacy the order as written has a max dose of 8 puffs per day.  This could be changed to max dose of 12 puffs per day and patient could get 2 inhalers.  See note below patient is requesting 2 inhalers 1 for work and 1 for home.   Please advise   Katey Lr RN

## 2017-08-07 NOTE — TELEPHONE ENCOUNTER
Reason for Call:  Other prescription    Detailed comments: Patient states she usually picks up 2 albuterol inhalers at the pharmacy, 1 for work and 1 for home and this time the pharmacy only gave her one. Please call.    Phone Number Patient can be reached at:  [phone]      Best Time: any    Can we leave a detailed message on this number? Not Applicable    Call taken on 8/7/2017 at 7:50 AM by Seema Keating

## 2017-09-06 ASSESSMENT — ASTHMA QUESTIONNAIRES: ACT_TOTALSCORE: 15

## 2017-11-08 ENCOUNTER — TELEPHONE (OUTPATIENT)
Dept: FAMILY MEDICINE | Facility: CLINIC | Age: 42
End: 2017-11-08

## 2017-11-08 NOTE — LETTER
November 8, 2017      Di Higgins  [address]  [place] [address]      Dear Di,     Your clinic record indicates that you are due for an asthma update. We have a survey tool called an ACT (or Asthma Control Test) we use to measure the level of control of your asthma. Please complete the enclosed questionnaire and mail it back to us in the self-addressed stamped envelope.     If you have questions about this letter please contact your provider.     Sincerely,    Your [facility]

## 2017-11-08 NOTE — TELEPHONE ENCOUNTER
Panel Management Review      Patient has the following on her problem list:     Asthma review     ACT Total Scores 9/5/2017   ACT TOTAL SCORE -   ASTHMA ER VISITS -   ASTHMA HOSPITALIZATIONS -   ACT TOTAL SCORE (Goal Greater than or Equal to 20) 15   In the past 12 months, how many times did you visit the emergency room for your asthma without being admitted to the hospital? 0   In the past 12 months, how many times were you hospitalized overnight because of your asthma? 0      1. Is Asthma diagnosis on the Problem List? Yes    2. Is Asthma listed on Health Maintenance? Yes    3. Patient is due for:  ACT        Composite cancer screening  Chart review shows that this patient is due/due soon for the following None  Summary:    Patient is due/failing the following:   ACT    Action needed:   Patient needs to do ACT.    Type of outreach:    Sent letter. and Copy of ACT mailed to patient, will reach out in 5 days.  -Postponing message for 2 weeks and will contact patient in 2 weeks    Questions for provider review:    None                                                                                                                                    Geovanna Olivarse MA       Chart routed to Care Team .

## 2017-11-22 ASSESSMENT — ASTHMA QUESTIONNAIRES: ACT_TOTALSCORE: 18

## 2018-02-21 ENCOUNTER — TELEPHONE (OUTPATIENT)
Dept: FAMILY MEDICINE | Facility: CLINIC | Age: 43
End: 2018-02-21

## 2018-02-21 NOTE — LETTER
February 21, 2018      Di Higgins  [address]  [place] [address]      Dear Di,     Your clinic record indicates that you are due for an asthma update. We have a survey tool called an ACT (or Asthma Control Test) we use to measure the level of control of your asthma. Please complete the enclosed questionnaire and mail it back to us in the self-addressed stamped envelope.     If you have questions about this letter please contact your provider.     Sincerely,    Your [facility]

## 2018-02-21 NOTE — TELEPHONE ENCOUNTER
Panel Management Review      Patient has the following on her problem list:     Asthma review     ACT Total Scores 11/21/2017   ACT TOTAL SCORE -   ASTHMA ER VISITS -   ASTHMA HOSPITALIZATIONS -   ACT TOTAL SCORE (Goal Greater than or Equal to 20) 18   In the past 12 months, how many times did you visit the emergency room for your asthma without being admitted to the hospital? 0   In the past 12 months, how many times were you hospitalized overnight because of your asthma? 0      1. Is Asthma diagnosis on the Problem List? Yes    2. Is Asthma listed on Health Maintenance? Yes    3. Patient is due for:  ACT and AAP      Composite cancer screening  Chart review shows that this patient is due/due soon for the following None  Summary:    Patient is due/failing the following:   AAP and ACT    Action needed:   Patient needs to do ACT.    Type of outreach:    Sent letter. and Copy of ACT mailed to patient, will reach out in 5 days.   -postponing message for 2 weeks and will check with patient .    Questions for provider review:    None                                                                                                                                    Geovanna Olivares MA       Chart routed to Care Team .

## 2018-02-27 ENCOUNTER — TELEPHONE (OUTPATIENT)
Dept: FAMILY MEDICINE | Facility: CLINIC | Age: 43
End: 2018-02-27

## 2018-03-06 ASSESSMENT — ASTHMA QUESTIONNAIRES: ACT_TOTALSCORE: 13

## 2018-03-07 NOTE — TELEPHONE ENCOUNTER
Please see Telephone Encounter 02/2/2018. ACT return back from patient and scored: 13.  -Postponing message for 2 months and will mail out ACT to patient again.  Geovanna Olivares MA

## 2018-03-18 DIAGNOSIS — J45.40 MODERATE PERSISTENT ASTHMA WITHOUT COMPLICATION: ICD-10-CM

## 2018-03-20 RX ORDER — ALBUTEROL SULFATE 90 UG/1
AEROSOL, METERED RESPIRATORY (INHALATION)
Qty: 36 G | Refills: 0 | OUTPATIENT
Start: 2018-03-20

## 2018-03-20 RX ORDER — ALBUTEROL SULFATE 90 UG/1
2 AEROSOL, METERED RESPIRATORY (INHALATION) EVERY 6 HOURS PRN
Qty: 3 INHALER | Refills: 1 | Status: SHIPPED | OUTPATIENT
Start: 2018-03-20 | End: 2018-04-13

## 2018-04-13 ENCOUNTER — TELEPHONE (OUTPATIENT)
Dept: FAMILY MEDICINE | Facility: CLINIC | Age: 43
End: 2018-04-13

## 2018-04-13 DIAGNOSIS — J45.40 MODERATE PERSISTENT ASTHMA, UNCOMPLICATED: ICD-10-CM

## 2018-04-13 DIAGNOSIS — J45.40 MODERATE PERSISTENT ASTHMA WITHOUT COMPLICATION: Primary | ICD-10-CM

## 2018-04-13 DIAGNOSIS — R11.0 NAUSEA: ICD-10-CM

## 2018-04-13 RX ORDER — ONDANSETRON 8 MG/1
8 TABLET, FILM COATED ORAL EVERY 8 HOURS PRN
Qty: 20 TABLET | Refills: 0 | Status: SHIPPED | OUTPATIENT
Start: 2018-04-13 | End: 2021-04-02

## 2018-04-13 RX ORDER — ALBUTEROL SULFATE 90 UG/1
2 AEROSOL, METERED RESPIRATORY (INHALATION) EVERY 4 HOURS PRN
Qty: 2 INHALER | Refills: 3 | Status: SHIPPED | OUTPATIENT
Start: 2018-04-13 | End: 2018-05-26

## 2018-04-13 NOTE — TELEPHONE ENCOUNTER
Reason for Call:  Other prescription    Detailed comments: See [employer] message-  [facility] submitted a Prior Auth request for my Ventolin inhaler/ Zofran please approve.  Regarding the Zofran helps with nauseated feeling while traveling.  [facility]      Phone Number Patient can be reached at: Home number on file [phone] (home)    Best Time: any    Can we leave a detailed message on this number? YES    Call taken on 4/13/2018 at 7:13 AM by Doreen Juan

## 2018-05-26 DIAGNOSIS — J45.40 MODERATE PERSISTENT ASTHMA, UNCOMPLICATED: ICD-10-CM

## 2018-05-26 NOTE — LETTER
May 30, 2018        Di Higgins  [address]  [place] [address]        Dear Di,     Your clinic record indicates that you are due for an asthma update. We have a survey tool called an ACT (or Asthma Control Test) we use to measure the level of control of your asthma. Please complete the enclosed questionnaire and mail it back to us in the self-addressed stamped envelope.     If you have questions about this letter please contact your provider.     Sincerely,    Your [facility]

## 2018-05-29 RX ORDER — ALBUTEROL SULFATE 90 UG/1
AEROSOL, METERED RESPIRATORY (INHALATION)
Qty: 17 G | Refills: 1 | Status: SHIPPED | OUTPATIENT
Start: 2018-05-29 | End: 2018-07-25

## 2018-05-29 NOTE — TELEPHONE ENCOUNTER
Pending Prescriptions:                       Disp   Refills    PROAIR  (90 Base) MCG/ACT inhaler *17 g   0            Sig: INHALE 2 PUFFS INTO THE LUNGS EVERY 4 HOURS AS           NEEDED    Routing refill request to provider for review/approval because:  No ACT on file, patient has not followed up for asthma since 7/28/17.     Aileen Strickland RN on 5/29/2018 at 1:54 PM

## 2018-06-15 ASSESSMENT — ASTHMA QUESTIONNAIRES: ACT_TOTALSCORE: 18

## 2018-07-18 ENCOUNTER — TELEPHONE (OUTPATIENT)
Dept: FAMILY MEDICINE | Facility: CLINIC | Age: 43
End: 2018-07-18

## 2018-07-18 NOTE — TELEPHONE ENCOUNTER
Panel Management Review      Patient has the following on her problem list:     Asthma review     ACT Total Scores 6/14/2018   ACT TOTAL SCORE -   ASTHMA ER VISITS -   ASTHMA HOSPITALIZATIONS -   ACT TOTAL SCORE (Goal Greater than or Equal to 20) 18   In the past 12 months, how many times did you visit the emergency room for your asthma without being admitted to the hospital? 0   In the past 12 months, how many times were you hospitalized overnight because of your asthma? 0      1. Is Asthma diagnosis on the Problem List? Yes    2. Is Asthma listed on Health Maintenance? Yes    3. Patient is due for:  AAP      Composite cancer screening  Chart review shows that this patient is due/due soon for the following None  Summary:    Patient is due/failing the following:   AAP    Action needed:   Routed to provider for review.    Type of outreach:    Sent letter.    Questions for provider review:    None                                                                                                                                    Angus Dexter       Chart routed to Provider .

## 2018-07-19 NOTE — TELEPHONE ENCOUNTER
Patient has appointment 7/25/2018 with provider.  Tawana LEGGETT CMA ([facility])

## 2018-07-23 NOTE — PROGRESS NOTES
SUBJECTIVE:   Di Higgins is a 42 year old female who presents to clinic today for the following health issues:    Asthma Follow-Up    Been okay; taking Proair was previously using ventolin works better.  Was ACT completed today?    No  ACT Total Scores 6/14/2018   ACT TOTAL SCORE -   ASTHMA ER VISITS -   ASTHMA HOSPITALIZATIONS -   ACT TOTAL SCORE (Goal Greater than or Equal to 20) 18   In the past 12 months, how many times did you visit the emergency room for your asthma without being admitted to the hospital? 0   In the past 12 months, how many times were you hospitalized overnight because of your asthma? 0       Recent asthma triggers that patient is dealing with: None    Rt Arm Pain: x 1 week   From the neck down the arm over the shoulder.  No history of injury  Does work a lot on the computer.      Amount of exercise or physical activity: 2-3 days/week for an average of 30-45 minutes    Problems taking medications regularly: No    Medication side effects: none    Diet: regular (no restrictions)    Problem list and histories reviewed & adjusted, as indicated.  Additional history: as documented    Patient Active Problem List   Diagnosis     GERD (gastroesophageal reflux disease)     Seasonal allergic rhinitis     Allergic rhinitis due to animal dander     House dust mite allergy     Rhinitis, allergic to other allergen     Vitamin D deficiency     Moderate persistent asthma     CARDIOVASCULAR SCREENING; LDL GOAL LESS THAN 160     Diagnostic skin and sensitization tests     Nonintractable paroxysmal hemicrania     Past Surgical History:   Procedure Laterality Date     HYSTERECTOMY, PAP NO LONGER INDICATED  12/09/2016     HYSTERECTOMY, VAGINAL  12/09/2016    uterine leiomyoma, adenomyosis     TUBAL LIGATION  1999    laparoscopic tubal       Social History   Substance Use Topics     Smoking status: Former Smoker     Smokeless tobacco: Former User     Quit date: 1/2/1985     Alcohol use Yes      Comment:  "occasional     Family History   Problem Relation Age of Onset     Asthma Father      Neurologic Disorder Sister      MS         Reviewed and updated as needed this visit by clinical staff  Tobacco  Allergies  Meds  Med Hx  Surg Hx  Fam Hx  Soc Hx      ROS:  Constitutional, HEENT, cardiovascular, pulmonary, gi and gu systems are negative, except as otherwise noted.    OBJECTIVE:     /74 (BP Location: Left arm, Patient Position: Chair, Cuff Size: Adult Regular)  Pulse 90  Temp 99.1  F (37.3  C) (Oral)  Resp 13  Ht 5' 1\" (1.549 m)  Wt 135 lb (61.2 kg)  LMP 11/10/2016  SpO2 97%  Breastfeeding? No  BMI 25.51 kg/m2  Body mass index is 25.51 kg/(m^2).  GENERAL: healthy, alert and no distress  NECK: no adenopathy and thyroid normal to palpation  RESP: lungs clear to auscultation - no rales, rhonchi or wheezes  CV: regular rate and rhythm, no murmur, click or rub, no peripheral edema.  ABDOMEN: soft, nontender, no masses and bowel sounds normal  MS: Rt shoulder vague tenderness over the shoulder. FROM. A patch of papular rash just above flexure of elbow and a few spots in back of arm  Diagnostic Test Results:  none     ASSESSMENT/PLAN:   (J45.40) Moderate persistent asthma, uncomplicated  Comment: Proair does not work well as Ventolin which is not formulary. Will prescribe Ventolin and can do a PA and is ready to pay extra cost . Also using Advair.  Plan: albuterol (PROAIR HFA/PROVENTIL HFA/VENTOLIN         HFA) 108 (90 Base) MCG/ACT Inhaler    (M79.621) Pain of right upper arm  Comment: Consistent with muscle strain, possibly postural or repetitive usediscussed stretches and NSAIDs . Has a papular rash, not restricted to a given dermatome ?shingles  Plan: Ibuprofen    Call or return to clinic prn if these symptoms worsen or fail to improve as anticipated in 1 month.    Amilcar Acosta MD  [facility]  "

## 2018-07-25 ENCOUNTER — OFFICE VISIT (OUTPATIENT)
Dept: FAMILY MEDICINE | Facility: CLINIC | Age: 43
End: 2018-07-25
Payer: COMMERCIAL

## 2018-07-25 VITALS
BODY MASS INDEX: 25.49 KG/M2 | SYSTOLIC BLOOD PRESSURE: 122 MMHG | OXYGEN SATURATION: 97 % | DIASTOLIC BLOOD PRESSURE: 74 MMHG | TEMPERATURE: 99.1 F | RESPIRATION RATE: 13 BRPM | HEIGHT: 61 IN | WEIGHT: 135 LBS | HEART RATE: 90 BPM

## 2018-07-25 DIAGNOSIS — R11.0 NAUSEA: Primary | ICD-10-CM

## 2018-07-25 DIAGNOSIS — M79.621 PAIN OF RIGHT UPPER ARM: ICD-10-CM

## 2018-07-25 DIAGNOSIS — J45.40 MODERATE PERSISTENT ASTHMA, UNCOMPLICATED: ICD-10-CM

## 2018-07-25 PROCEDURE — 99213 OFFICE O/P EST LOW 20 MIN: CPT | Performed by: FAMILY MEDICINE

## 2018-07-25 RX ORDER — ALBUTEROL SULFATE 90 UG/1
2 AEROSOL, METERED RESPIRATORY (INHALATION) EVERY 6 HOURS PRN
Qty: 2 INHALER | Refills: 3 | Status: SHIPPED | OUTPATIENT
Start: 2018-07-25 | End: 2018-10-09

## 2018-07-25 NOTE — MR AVS SNAPSHOT
After Visit Summary   7/25/2018    Di Higgins    MRN: [id number]           Patient Information     Date Of Birth          1975        Visit Information        Provider Department      7/25/2018 9:00 AM Amilcar Acosta MD [facility]        Today's Diagnoses     Nausea    -  1    Moderate persistent asthma, uncomplicated        Pain of right upper arm          Care Instructions    [facility]    If you have any questions regarding to your visit please contact your care team:       Team Purple:   Clinic Hours Telephone Number   Dr. Hellen Epps   7am-7pm  Monday - Thursday   7am-5pm  Fridays  [phone]  (Appointment scheduling available 24/7)    Questions about your recent visit?   Team Line:  [phone]   Urgent Care - [place] and [facility] - 11am-9pm Monday-Friday Saturday-Sunday- 9am-5pm   [place] - 5pm-9pm Monday-Friday Saturday-Sunday- 9am-5pm  [phone] - [place]  [phone] [facility]       What options do I have for a visit other than an office visit? We offer electronic visits (e-visits) and telephone visits, when medically appropriate.  Please check with your medical insurance to see if these types of visits are covered, as you will be responsible for any charges that are not paid by your insurance.      You can use [employer] (secure electronic communication) to access to your chart, send your primary care provider a message, or make an appointment. Ask a team member how to get started.     For a price quote for your services, please call our Consumer Price Line at [phone] or our Imaging Cost estimation line at [phone] (for imaging tests).    Angus Dexter                                        Rotator Cuff Strain Rehabilitation Exercises                  You may do all of these exercises right away.   Isometric shoulder external rotation:  a doorway  with your elbow bent 90 degrees and the back of the wrist on your injured side pressed against the door frame. Try to press your hand outward into the door frame. Hold for 5 seconds. Do 3 sets of 10.   Isometric shoulder internal rotation:  a doorway with your elbow bent 90 degrees and the front of the wrist on your injured side pressed against the door frame. Try to press your palm into the door frame. Hold for 5 seconds. Do 3 sets of 10.   Wand exercise: Flexion: Stand upright and hold a stick in both hands, palms down. Stretch your arms by lifting them over your head, keeping your arms straight. Hold for 5 seconds and return to the starting position. Repeat 10 times.   Wand exercise: Extension: Stand upright and hold a stick in both hands behind your back. Move the stick away from your back. Hold the end position for 5 seconds. Relax and return to the starting position. Repeat 10 times.   Wand exercise: External rotation: Lie on your back and hold a stick in both hands, palms up. Your upper arms should be resting on the floor with your elbows at your sides and bent 90 degrees. Use your uninjured arm to push your injured arm out away from your body. Keep the elbow of your injured arm at your side while it is being pushed. Hold the stretch for 5 seconds. Repeat 10 times.   Wand exercise: Shoulder abduction and adduction: Stand and hold a stick with both hands, palms facing away from your body. Rest the stick against the front of your thighs. Use your uninjured arm to push your injured arm out to the side and up as high as possible. Keep your arms straight. Hold for 5 seconds. Repeat 10 times.   Resisted shoulder external rotation: Stand sideways next to a door with your injured arm farther from the door. Tie a knot in the end of the tubing and shut the knot in the door at waist level. Hold the other end of the tubing with the hand of your injured arm. Rest the hand of your injured arm across your stomach.  Keeping your elbow in at your side, rotate your arm outward and away from your waist. Make sure you keep your elbow bent 90 degrees and your forearm parallel to the floor. Repeat 10 times. Build up to 3 sets of 10.   Resisted shoulder internal rotation: Stand sideways next to a door with your injured arm closest to the door. Tie a knot in the end of the tubing and shut the knot in the door at waist level. Hold the other end of the tubing with the hand of your injured arm. Bend the elbow of your injured arm 90 degrees. Keeping your elbow in at your side, rotate your forearm across your body and then back to the starting position. Make sure you keep your forearm parallel to the floor. Do 3 sets of 10.   Scaption: Stand with your arms at your sides and with your elbows straight. Slowly raise your arms to eye level. As you raise your arms, spread them apart so that they are only slightly in front of your body (at about a 30-degree angle to the front of your body). Point your thumbs toward the ceiling. Hold for 2 seconds and lower your arms slowly. Do 3 sets of 10. Progress to holding a soup can or light weight when you are doing the exercise and increase the weight as the exercise gets easier.   Side-lying external rotation: Lie on your uninjured side with your injured arm at your side and your elbow bent 90 degrees. Keeping your elbow against your side, raise your forearm toward the ceiling and hold for 2 seconds. Slowly lower your arm. Do 3 sets of 10. You can start doing this exercise holding a soup can or light weight and gradually increase the weight as long as there is no pain.   Horizontal abduction: Lie on your stomach on a table or the edge of a bed with the arm on your injured side hanging down over the edge. Raise your arm out to the side, with your thumb pointed toward the ceiling, until your arm is parallel to the floor. Hold for 2 seconds and then lower it slowly. Start this exercise with no weight. As you  "get stronger, add a light weight or hold a soup can. Do 3 sets of 10.   Push-up with a plus: Begin on the floor on your hands and knees. Keep your arms a shoulder width apart and lift your feet off the floor. Arch your back as high as possible and round your shoulders (this is the \"plus\" part or the exercise). Bend your elbows and lower your body to the floor. Return to the starting position and arch your back again. Do 3 sets of 10.   Published by [employer].  This content is reviewed periodically and is subject to change as new health information becomes available. The information is intended to inform and educate and is not a replacement for medical evaluation, advice, diagnosis or treatment by a healthcare professional.   Written by Fani Baxter MS, PT, and María Delgado PT, [facility], \[facility]\"", for [employer].   ? 2010 [facility] and/or its affiliates. All Rights Reserved.   Copyright   Clinical Reference Systems 2011  Adult Health Advisor                      Follow-ups after your visit        Who to contact     If you have questions or need follow up information about today's clinic visit or your schedule please contact [facility] directly at [phone].  Normal or non-critical lab and imaging results will be communicated to you by MyChart, letter or phone within 4 business days after the clinic has received the results. If you do not hear from us within 7 days, please contact the clinic through [employer] or phone. If you have a critical or abnormal lab result, we will notify you by phone as soon as possible.  Submit refill requests through [employer]urse or call your pharmacy and they will forward the refill request to us. Please allow 3 business days for your refill to be completed.          Additional Information About Your Visit        [employer] and Wanted Information     Curse gives you secure access to your electronic health record. If you see a primary care provider, you can also send messages to your care team " "and make appointments. If you have questions, please call your primary care clinic.  If you do not have a primary care provider, please call [phone] and they will assist you.        Care EveryWhere ID     This is your Care EveryWhere ID. This could be used by other organizations to access your [place] medical records  [id number]        Your Vitals Were     Pulse Temperature Respirations Height Last Period Pulse Oximetry    90 99.1  F (37.3  C) (Oral) 13 5' 1\" (1.549 m) 11/10/2016 97%    Breastfeeding? BMI (Body Mass Index)                No 25.51 kg/m2           Blood Pressure from Last 3 Encounters:   07/25/18 122/74   07/28/17 122/80   01/23/17 130/72    Weight from Last 3 Encounters:   07/25/18 135 lb (61.2 kg)   07/28/17 137 lb (62.1 kg)   01/23/17 143 lb (64.9 kg)              Today, you had the following     No orders found for display         Today's Medication Changes          These changes are accurate as of 7/25/18  9:56 AM.  If you have any questions, ask your nurse or doctor.               These medicines have changed or have updated prescriptions.        Dose/Directions    * albuterol (2.5 MG/3ML) 0.083% neb solution   This may have changed:  Another medication with the same name was added. Make sure you understand how and when to take each.   Used for:  Asthma exacerbation   Changed by:  Amilcar Acosta MD        Dose:  1 vial   Take 1 vial (2.5 mg) by nebulization every 6 hours as needed for shortness of breath / dyspnea or wheezing   Quantity:  1 Box   Refills:  4       * albuterol 108 (90 Base) MCG/ACT Inhaler   Commonly known as:  PROAIR HFA/PROVENTIL HFA/VENTOLIN HFA   This may have changed:  You were already taking a medication with the same name, and this prescription was added. Make sure you understand how and when to take each.   Used for:  Moderate persistent asthma, uncomplicated   Changed by:  Amilcar Acosta MD        Dose:  2 puff   Inhale 2 puffs into the lungs " every 6 hours as needed for shortness of breath / dyspnea or wheezing   Quantity:  2 Inhaler   Refills:  3       * Notice:  This list has 2 medication(s) that are the same as other medications prescribed for you. Read the directions carefully, and ask your doctor or other care provider to review them with you.         Where to get your medicines      These medications were sent to [facility] [address] [facility]  [address] [address]    Hours:  24-hours Phone:  [phone]     albuterol 108 (90 Base) MCG/ACT Inhaler                Primary Care Provider Office Phone # Fax #    Amilcar Acosta [phone] [address] [address]        Equal Access to Services     [facility]: Hadii aad ku hadasho Soomaali, waaxda luqadaha, qaybta kaalmada adeegyada, waxay rory haycora nayak . So [facility] [phone].    ATENCIÓN: Si habla español, tiene a zheng disposición servicios gratuitos de asistencia lingüística. Llame al [phone].    We comply with applicable federal civil rights laws and [place] laws. We do not discriminate on the basis of race, color, national origin, age, disability, sex, sexual orientation, or gender identity.            Thank you!     Thank you for choosing [facility]  for your care. Our goal is always to provide you with excellent care. Hearing back from our patients is one way we can continue to improve our services. Please take a few minutes to complete the written survey that you may receive in the mail after your visit with us. Thank you!             Your Updated Medication List - Protect others around you: Learn how to safely use, store and throw away your medicines at www.disposemymeds.org.          This list is accurate as of 7/25/18  9:56 AM.  Always use your most recent med list.                   Brand Name Dispense  Instructions for use Diagnosis    * albuterol (2.5 MG/3ML) 0.083% neb solution     1 Box    Take 1 vial (2.5 mg) by nebulization every 6 hours as needed for shortness of breath / dyspnea or wheezing    Asthma exacerbation       * albuterol 108 (90 Base) MCG/ACT Inhaler    PROAIR HFA/PROVENTIL HFA/VENTOLIN HFA    2 Inhaler    Inhale 2 puffs into the lungs every 6 hours as needed for shortness of breath / dyspnea or wheezing    Moderate persistent asthma, uncomplicated       fluticasone-salmeterol 250-50 MCG/DOSE diskus inhaler    ADVAIR    3 Inhaler    Inhale 1 puff into the lungs 2 times daily    Moderate persistent asthma without complication, Allergic rhinitis due to animal dander       ibuprofen 600 MG tablet    ADVIL/MOTRIN    30 tablet    Take 1 tablet (600 mg) by mouth every 6 hours as needed for moderate pain    Neck pain       montelukast 10 MG tablet    SINGULAIR    90 tablet    Take 1 tablet (10 mg) by mouth At Bedtime    Allergic rhinitis due to animal dander       ondansetron 8 MG tablet    ZOFRAN    20 tablet    Take 1 tablet (8 mg) by mouth every 8 hours as needed for nausea    Nausea       order for DME     1 Device    Equipment being ordered: Nebulizer    Moderate persistent asthma with acute exacerbation       SINUS RINSE BOTTLE KIT NA      Spray 1 Bottle in nostril daily as needed.    Seasonal allergic rhinitis       * Notice:  This list has 2 medication(s) that are the same as other medications prescribed for you. Read the directions carefully, and ask your doctor or other care provider to review them with you.

## 2018-07-25 NOTE — NURSING NOTE
"Chief Complaint   Patient presents with     Asthma     Recheck Medication     Health Maintenance     AAP, PHQ2      Musculoskeletal Problem     Right arm     Initial /74 (BP Location: Left arm, Patient Position: Chair, Cuff Size: Adult Regular)  Pulse 90  Temp 99.1  F (37.3  C) (Oral)  Resp 13  Ht 5' 1\" (1.549 m)  Wt 135 lb (61.2 kg)  LMP 11/10/2016  SpO2 97%  Breastfeeding? No  BMI 25.51 kg/m2 Estimated body mass index is 25.51 kg/(m^2) as calculated from the following:    Height as of this encounter: 5' 1\" (1.549 m).    Weight as of this encounter: 135 lb (61.2 kg).  BP completed using cuff size: regular    Angus Dexter  "

## 2018-07-25 NOTE — PATIENT INSTRUCTIONS
[facility]    If you have any questions regarding to your visit please contact your care team:       Team Purple:   Clinic Hours Telephone Number   Dr. Hellen Epps   7am-7pm  Monday - Thursday   7am-5pm  Fridays  [phone]  (Appointment scheduling available 24/7)    Questions about your recent visit?   Team Line:  [phone]   Urgent Care - [place] and [facility] - 11am-9pm Monday-Friday Saturday-Sunday- 9am-5pm   [place] - 5pm-9pm Monday-Friday Saturday-Sunday- 9am-5pm  [phone] - [place]  [phone] [facility]       What options do I have for a visit other than an office visit? We offer electronic visits (e-visits) and telephone visits, when medically appropriate.  Please check with your medical insurance to see if these types of visits are covered, as you will be responsible for any charges that are not paid by your insurance.      You can use [employer] (secure electronic communication) to access to your chart, send your primary care provider a message, or make an appointment. Ask a team member how to get started.     For a price quote for your services, please call our Consumer Price Line at [phone] or our Imaging Cost estimation line at [phone] (for imaging tests).    Angus Dexter                                        Rotator Cuff Strain Rehabilitation Exercises                  You may do all of these exercises right away.   Isometric shoulder external rotation:  a doorway with your elbow bent 90 degrees and the back of the wrist on your injured side pressed against the door frame. Try to press your hand outward into the door frame. Hold for 5 seconds. Do 3 sets of 10.   Isometric shoulder internal rotation:  a doorway with your elbow bent 90 degrees and the front of the wrist on your injured side pressed against the door frame. Try to press your palm into the door frame. Hold for 5  seconds. Do 3 sets of 10.   Wand exercise: Flexion: Stand upright and hold a stick in both hands, palms down. Stretch your arms by lifting them over your head, keeping your arms straight. Hold for 5 seconds and return to the starting position. Repeat 10 times.   Wand exercise: Extension: Stand upright and hold a stick in both hands behind your back. Move the stick away from your back. Hold the end position for 5 seconds. Relax and return to the starting position. Repeat 10 times.   Wand exercise: External rotation: Lie on your back and hold a stick in both hands, palms up. Your upper arms should be resting on the floor with your elbows at your sides and bent 90 degrees. Use your uninjured arm to push your injured arm out away from your body. Keep the elbow of your injured arm at your side while it is being pushed. Hold the stretch for 5 seconds. Repeat 10 times.   Wand exercise: Shoulder abduction and adduction: Stand and hold a stick with both hands, palms facing away from your body. Rest the stick against the front of your thighs. Use your uninjured arm to push your injured arm out to the side and up as high as possible. Keep your arms straight. Hold for 5 seconds. Repeat 10 times.   Resisted shoulder external rotation: Stand sideways next to a door with your injured arm farther from the door. Tie a knot in the end of the tubing and shut the knot in the door at waist level. Hold the other end of the tubing with the hand of your injured arm. Rest the hand of your injured arm across your stomach. Keeping your elbow in at your side, rotate your arm outward and away from your waist. Make sure you keep your elbow bent 90 degrees and your forearm parallel to the floor. Repeat 10 times. Build up to 3 sets of 10.   Resisted shoulder internal rotation: Stand sideways next to a door with your injured arm closest to the door. Tie a knot in the end of the tubing and shut the knot in the door at waist level. Hold the other end  "of the tubing with the hand of your injured arm. Bend the elbow of your injured arm 90 degrees. Keeping your elbow in at your side, rotate your forearm across your body and then back to the starting position. Make sure you keep your forearm parallel to the floor. Do 3 sets of 10.   Scaption: Stand with your arms at your sides and with your elbows straight. Slowly raise your arms to eye level. As you raise your arms, spread them apart so that they are only slightly in front of your body (at about a 30-degree angle to the front of your body). Point your thumbs toward the ceiling. Hold for 2 seconds and lower your arms slowly. Do 3 sets of 10. Progress to holding a soup can or light weight when you are doing the exercise and increase the weight as the exercise gets easier.   Side-lying external rotation: Lie on your uninjured side with your injured arm at your side and your elbow bent 90 degrees. Keeping your elbow against your side, raise your forearm toward the ceiling and hold for 2 seconds. Slowly lower your arm. Do 3 sets of 10. You can start doing this exercise holding a soup can or light weight and gradually increase the weight as long as there is no pain.   Horizontal abduction: Lie on your stomach on a table or the edge of a bed with the arm on your injured side hanging down over the edge. Raise your arm out to the side, with your thumb pointed toward the ceiling, until your arm is parallel to the floor. Hold for 2 seconds and then lower it slowly. Start this exercise with no weight. As you get stronger, add a light weight or hold a soup can. Do 3 sets of 10.   Push-up with a plus: Begin on the floor on your hands and knees. Keep your arms a shoulder width apart and lift your feet off the floor. Arch your back as high as possible and round your shoulders (this is the \"plus\" part or the exercise). Bend your elbows and lower your body to the floor. Return to the starting position and arch your back again. Do 3 " sets of 10.   Published by [employer].  This content is reviewed periodically and is subject to change as new health information becomes available. The information is intended to inform and educate and is not a replacement for medical evaluation, advice, diagnosis or treatment by a healthcare professional.   Written by Fani Baxter MS, PT, and María Delgado PT, [facility], for [employer].   ? 2010 [facility] and/or its affiliates. All Rights Reserved.   Copyright   Clinical Reference Systems 2011  Adult Health Advisor

## 2018-07-26 ENCOUNTER — MYC MEDICAL ADVICE (OUTPATIENT)
Dept: FAMILY MEDICINE | Facility: CLINIC | Age: 43
End: 2018-07-26

## 2018-07-26 DIAGNOSIS — R21 RASH AND NONSPECIFIC SKIN ERUPTION: Primary | ICD-10-CM

## 2018-07-26 RX ORDER — VALACYCLOVIR HYDROCHLORIDE 1 G/1
1000 TABLET, FILM COATED ORAL 3 TIMES DAILY
Qty: 21 TABLET | Refills: 0 | Status: SHIPPED | OUTPATIENT
Start: 2018-07-26 | End: 2021-03-31

## 2018-07-26 NOTE — TELEPHONE ENCOUNTER
Please see mychart message below.  Patient was seen yesterday  No rash was mentioned at last OV? But patient is saying that she is updating provider on her rash  Does she need to be seen?    Steve Mendoza RN

## 2018-07-30 ENCOUNTER — MYC MEDICAL ADVICE (OUTPATIENT)
Dept: FAMILY MEDICINE | Facility: CLINIC | Age: 43
End: 2018-07-30

## 2018-07-30 NOTE — TELEPHONE ENCOUNTER
Dr. Acosta,  Please see Magoosht message below and advise.    Tawana Tang RN  [facility]

## 2018-08-02 ENCOUNTER — MYC MEDICAL ADVICE (OUTPATIENT)
Dept: FAMILY MEDICINE | Facility: CLINIC | Age: 43
End: 2018-08-02

## 2018-08-14 ENCOUNTER — MYC MEDICAL ADVICE (OUTPATIENT)
Dept: FAMILY MEDICINE | Facility: CLINIC | Age: 43
End: 2018-08-14

## 2018-09-09 DIAGNOSIS — J30.81 ALLERGIC RHINITIS DUE TO ANIMAL DANDER: ICD-10-CM

## 2018-09-09 DIAGNOSIS — J45.40 MODERATE PERSISTENT ASTHMA WITHOUT COMPLICATION: ICD-10-CM

## 2018-09-10 NOTE — TELEPHONE ENCOUNTER
Called and verified with pharmacy on duplicate prescription. Please disregard. TK Wallace

## 2018-09-10 NOTE — TELEPHONE ENCOUNTER
Refused Prescriptions:                       Disp   Refills    ADVAIR DISKUS 250-50 MCG/DOSE diskus inhal*       0        Sig: INHALE 1 PUFF INTO THE LUNGS TWICE DAILY  Refused By: JACQUELIN HERR  Reason for Refusal: Duplicate    Refused duplicate request. Closing encounter.    Jacquelin Herr, RN

## 2018-09-24 DIAGNOSIS — J45.40 MODERATE PERSISTENT ASTHMA, UNCOMPLICATED: ICD-10-CM

## 2018-09-24 RX ORDER — ALBUTEROL SULFATE 90 UG/1
2 AEROSOL, METERED RESPIRATORY (INHALATION) EVERY 6 HOURS PRN
Qty: 2 INHALER | Refills: 3 | OUTPATIENT
Start: 2018-09-24

## 2018-09-24 NOTE — TELEPHONE ENCOUNTER
Refused Prescriptions:                       Disp   Refills    albuterol (PROAIR HFA/PROVENTIL HFA/VENTOL*2 Inha*3        Sig: Inhale 2 puffs into the lungs every 6 hours as needed           for shortness of breath / dyspnea or wheezing  Refused By: VIANEY PIERRE  Reason for Refusal: Duplicate  Reason for Refusal Comment: See rx sent 7/25/18    Confirmed duplicate request with pharmacy.  Closing encounter.    Vianey Pierre RN

## 2018-09-24 NOTE — TELEPHONE ENCOUNTER
Requested Prescriptions   Pending Prescriptions Disp Refills     albuterol (PROAIR HFA/PROVENTIL HFA/VENTOLIN HFA) 108 (90 Base) MCG/ACT inhaler 2 Inhaler 3     Sig: Inhale 2 puffs into the lungs every 6 hours as needed for shortness of breath / dyspnea or wheezing    There is no refill protocol information for this order        Last Written Prescription Date:  7/25/2018  Last Fill Quantity: 2,  # refills: 3   Last office visit: 7/25/2018 with prescribing provider:  Karla   Future Office Visit:    ACT Total Scores 11/21/2017 3/5/2018 6/14/2018   ACT TOTAL SCORE - - -   ASTHMA ER VISITS - - -   ASTHMA HOSPITALIZATIONS - - -   ACT TOTAL SCORE (Goal Greater than or Equal to 20) 18 13 18   In the past 12 months, how many times did you visit the emergency room for your asthma without being admitted to the hospital? 0 0 0   In the past 12 months, how many times were you hospitalized overnight because of your asthma? 0 0 0

## 2018-10-09 ENCOUNTER — MYC REFILL (OUTPATIENT)
Dept: FAMILY MEDICINE | Facility: CLINIC | Age: 43
End: 2018-10-09

## 2018-10-09 DIAGNOSIS — J45.40 MODERATE PERSISTENT ASTHMA, UNCOMPLICATED: ICD-10-CM

## 2018-10-09 DIAGNOSIS — J45.40 MODERATE PERSISTENT ASTHMA WITHOUT COMPLICATION: ICD-10-CM

## 2018-10-09 DIAGNOSIS — J30.81 ALLERGIC RHINITIS DUE TO ANIMAL DANDER: ICD-10-CM

## 2018-10-09 NOTE — LETTER
[facility]  [address]  [place] [address]  [phone]    October 11, 2018      Di Higgins  [address]  [place] [address]      Dear Di,     Your clinic record indicates that you are due for an asthma update. We have a survey tool called an ACT (or Asthma Control Test) we use to measure the level of control of your asthma. Please complete the enclosed questionnaire and mail it back to us in the self-addressed stamped envelope.     If you have questions about this letter please contact your provider.     Sincerely,    Your [facility] [place]

## 2018-10-10 NOTE — TELEPHONE ENCOUNTER
Message from MyChart:  Original authorizing provider: Amilcar Acosta MD    Di Higgins would like a refill of the following medications:  fluticasone-salmeterol (ADVAIR) 250-50 MCG/DOSE diskus inhaler [Amilcar Acosta MD]  albuterol (PROAIR HFA/PROVENTIL HFA/VENTOLIN HFA) 108 (90 Base) MCG/ACT Inhaler [Amilcar Acosta MD]    Preferred pharmacy: [facility] [address] [place] [address] [address] [place] [address] AT [facility]    Comment:  In the past I normally received Two inhalers in one box, why did that change? Thank you

## 2018-10-10 NOTE — TELEPHONE ENCOUNTER
"Routing refill request to provider for review/approval because:  Failed [facility] refill protocol, see below:      ACT Total Scores 11/21/2017 3/5/2018 6/14/2018   ACT TOTAL SCORE - - -   ASTHMA ER VISITS - - -   ASTHMA HOSPITALIZATIONS - - -   ACT TOTAL SCORE (Goal Greater than or Equal to 20) 18 13 18   In the past 12 months, how many times did you visit the emergency room for your asthma without being admitted to the hospital? 0 0 0   In the past 12 months, how many times were you hospitalized overnight because of your asthma? 0 0 0     Requested Prescriptions   Pending Prescriptions Disp Refills     fluticasone-salmeterol (ADVAIR) 250-50 MCG/DOSE diskus inhaler  Last Written Prescription Date:  7/28/17  Last Fill Quantity: 3,  # refills: 1   Last office visit: 7/25/2018 with prescribing provider:     Future Office Visit:     3 Inhaler 1     Sig: Inhale 1 puff into the lungs 2 times daily    Inhaled Steroids Protocol Failed    10/10/2018  7:38 AM       Failed - Asthma control assessment score within normal limits in last 6 months    Please review ACT score.          Passed - Patient is age 12 or older       Passed - Recent (6 mo) or future (30 days) visit within the authorizing provider's specialty    Patient had office visit in the last 6 months or has a visit in the next 30 days with authorizing provider or within the authorizing provider's specialty.  See \"Patient Info\" tab in inbasket, or \"Choose Columns\" in Meds & Orders section of the refill encounter.            albuterol (PROAIR HFA/PROVENTIL HFA/VENTOLIN HFA) 108 (90 Base) MCG/ACT inhaler  Last Written Prescription Date:  7/25/18  Last Fill Quantity: 2,  # refills: 3   Last office visit: 7/25/2018 with prescribing provider:     Future Office Visit:     2 Inhaler 3     Sig: Inhale 2 puffs into the lungs every 6 hours as needed for shortness of breath / dyspnea or wheezing    Asthma Maintenance Inhalers - Anticholinergics Failed    10/10/2018  7:38 AM       " "Failed - Asthma control assessment score within normal limits in last 6 months    Please review ACT score.          Passed - Patient is age 12 years or older       Passed - Recent (6 mo) or future (30 days) visit within the authorizing provider's specialty    Patient had office visit in the last 6 months or has a visit in the next 30 days with authorizing provider or within the authorizing provider's specialty.  See \"Patient Info\" tab in inbasket, or \"Choose Columns\" in Meds & Orders section of the refill encounter.            Roxy Bernardo, ERWIN - BC      "

## 2018-10-19 NOTE — TELEPHONE ENCOUNTER
Spoke to patient and she stated she mailed ACT in a couple days ago. Will check with TC today to see if it has arrived in the mail.  Tawana LEGGETT CMA ([facility])

## 2018-10-22 ENCOUNTER — TELEPHONE (OUTPATIENT)
Dept: FAMILY MEDICINE | Facility: CLINIC | Age: 43
End: 2018-10-22

## 2018-10-22 RX ORDER — ALBUTEROL SULFATE 90 UG/1
2 AEROSOL, METERED RESPIRATORY (INHALATION) EVERY 6 HOURS PRN
Qty: 2 INHALER | Refills: 1 | Status: SHIPPED | OUTPATIENT
Start: 2018-10-22 | End: 2019-05-14

## 2018-10-22 NOTE — TELEPHONE ENCOUNTER
Routing refill request to provider for review/approval because:  Act score      ACT Total Scores 3/5/2018 6/14/2018 10/22/2018   ACT TOTAL SCORE - - -   ASTHMA ER VISITS - - -   ASTHMA HOSPITALIZATIONS - - -   ACT TOTAL SCORE (Goal Greater than or Equal to 20) 13 18 19   In the past 12 months, how many times did you visit the emergency room for your asthma without being admitted to the hospital? 0 0 0   In the past 12 months, how many times were you hospitalized overnight because of your asthma? 0 0 0     Roxy Bernardo, ERWIN - BC

## 2018-10-23 ASSESSMENT — ASTHMA QUESTIONNAIRES
ACT_TOTALSCORE: 18
ACT_TOTALSCORE: 19

## 2018-10-23 NOTE — TELEPHONE ENCOUNTER
Called and spoke with patient. Informed patient of message. Patient verbally understand.   Geovanna Olivares MA

## 2018-11-05 ENCOUNTER — TELEPHONE (OUTPATIENT)
Dept: FAMILY MEDICINE | Facility: CLINIC | Age: 43
End: 2018-11-05

## 2018-11-05 NOTE — LETTER
November 5, 2018          Di Higgins,  [address]  [place] [address]        Dear Di Higgins      Monitoring and managing your preventative and chronic health conditions are very important to us. Our records indicate that you have not scheduled for Appointment with your provider and Asthma Check  which was recommended by Amilcar Dubose.      If you have received your health care elsewhere, please call the clinic so the information can be documented in your chart.    Please call [phone] or message us through your "[email or web address] account to schedule an appointment or provide information for your chart.     Feel free to contact us if you have any questions or concerns!    I look forward to seeing you and working with you on your health care needs.     Sincerely,         Amilcar Acosta / Angus Dexter

## 2018-11-05 NOTE — TELEPHONE ENCOUNTER
Panel Management Review      Patient has the following on her problem list:     Asthma review     ACT Total Scores 10/22/2018   ACT TOTAL SCORE -   ASTHMA ER VISITS -   ASTHMA HOSPITALIZATIONS -   ACT TOTAL SCORE (Goal Greater than or Equal to 20) 18   In the past 12 months, how many times did you visit the emergency room for your asthma without being admitted to the hospital? 0   In the past 12 months, how many times were you hospitalized overnight because of your asthma? 0      1. Is Asthma diagnosis on the Problem List? Yes    2. Is Asthma listed on Health Maintenance? Yes    3. Patient is due for:  AAP      Composite cancer screening  Chart review shows that this patient is due/due soon for the following None  Summary:    Patient is due/failing the following:   AAP and FOLLOW UP    Action needed:   Routed to provider for review.    Type of outreach:    Sent letter.    Questions for provider review:    None                                                                                                                                    Angus Dexter       Chart routed to Care Team .

## 2019-02-11 ENCOUNTER — TELEPHONE (OUTPATIENT)
Dept: FAMILY MEDICINE | Facility: CLINIC | Age: 44
End: 2019-02-11

## 2019-02-11 NOTE — LETTER
My Asthma Action Plan  Name: Di Higgins   YOB: 1975  Date: 2/17/2019   My doctor: Amilcar Acosta MD   My clinic: [facility]        My Control Medicine: Fluticasone + salmeterol (Advair) -  Diskus 250/50 mcg .  Montelukast (Singulair) -  10 mg .  My Rescue Medicine: Albuterol (Proair/Ventolin/Proventil) inhaler .   My Asthma Severity: moderate persistent  Avoid your asthma triggers: upper respiratory infections, pollens and cold air  None            GREEN ZONE   Good Control    I feel good    No cough or wheeze    Can work, sleep and play without asthma symptoms       Take your asthma control medicine every day.     1. If exercise triggers your asthma, take your rescue medication    15 minutes before exercise or sports, and    During exercise if you have asthma symptoms  2. Spacer to use with inhaler: If you have a spacer, make sure to use it with your inhaler             YELLOW ZONE Getting Worse  I have ANY of these:    I do not feel good    Cough or wheeze    Chest feels tight    Wake up at night   1. Keep taking your Green Zone medications  2. Start taking your rescue medicine:    every 20 minutes for up to 1 hour. Then every 4 hours for 24-48 hours.  3. If you stay in the Yellow Zone for more than 12-24 hours, contact your doctor.  4. If you do not return to the Green Zone in 12-24 hours or you get worse, start taking your oral steroid medicine if prescribed by your provider.           RED ZONE Medical Alert - Get Help  I have ANY of these:    I feel awful    Medicine is not helping    Breathing getting harder    Trouble walking or talking    Nose opens wide to breathe       1. Take your rescue medicine NOW  2. If your provider has prescribed an oral steroid medicine, start taking it NOW  3. Call your doctor NOW  4. If you are still in the Red Zone after 20 minutes and you have not reached your doctor:    Take your rescue medicine again and    Call 911 or go to the  emergency room right away    See your regular doctor within 2 weeks of an Emergency Room or Urgent Care visit for follow-up treatment.          Annual Reminders:  Meet with Asthma Educator,  Flu Shot in the Fall, consider Pneumonia Vaccination for patients with asthma (aged 19 and older).    Pharmacy:    [id number] - [place], [place] - [address] AT [facility] [address]  [id number] - [place], [place] - [address] [facility] AT [facility]                      Asthma Triggers  How To Control Things That Make Your Asthma Worse    Triggers are things that make your asthma worse.  Look at the list below to help you find your triggers and what you can do about them.  You can help prevent asthma flare-ups by staying away from your triggers.      Trigger                                                          What you can do   Cigarette Smoke  Tobacco smoke can make asthma worse. Do not allow smoking in your home, car or around you.  Be sure no one smokes at a child s day care or school.  If you smoke, ask your health care provider for ways to help you quit.  Ask family members to quit too.  Ask your health care provider for a referral to Quit Plan to help you quit smoking, or call 8-464-235-PLAN.     Colds, Flu, Bronchitis  These are common triggers of asthma. Wash your hands often.  Don t touch your eyes, nose or mouth.  Get a flu shot every year.     Dust Mites  These are tiny bugs that live in cloth or carpet. They are too small to see. Wash sheets and blankets in hot water every week.   Encase pillows and mattress in dust mite proof covers.  Avoid having carpet if you can. If you have carpet, vacuum weekly.   Use a dust mask and HEPA vacuum.   Pollen and Outdoor Mold  Some people are allergic to trees, grass, or weed pollen, or molds. Try to keep your windows closed.  Limit time out doors when pollen count is high.   Ask you health care provider about taking  medicine during allergy season.     Animal Dander  Some people are allergic to skin flakes, urine or saliva from pets with fur or feathers. Keep pets with fur or feathers out of your home.    If you can t keep the pet outdoors, then keep the pet out of your bedroom.  Keep the bedroom door closed.  Keep pets off cloth furniture and away from stuffed toys.     Mice, Rats, and Cockroaches  Some people are allergic to the waste from these pests.   Cover food and garbage.  Clean up spills and food crumbs.  Store grease in the refrigerator.   Keep food out of the bedroom.   Indoor Mold  This can be a trigger if your home has high moisture. Fix leaking faucets, pipes, or other sources of water.   Clean moldy surfaces.  Dehumidify basement if it is damp and smelly.   Smoke, Strong Odors, and Sprays  These can reduce air quality. Stay away from strong odors and sprays, such as perfume, powder, hair spray, paints, smoke incense, paint, cleaning products, candles and new carpet.   Exercise or Sports  Some people with asthma have this trigger. Be active!  Ask your doctor about taking medicine before sports or exercise to prevent symptoms.    Warm up for 5-10 minutes before and after sports or exercise.     Other Triggers of Asthma  Cold air:  Cover your nose and mouth with a scarf.  Sometimes laughing or crying can be a trigger.  Some medicines and food can trigger asthma.

## 2019-02-11 NOTE — TELEPHONE ENCOUNTER
Panel Management Review      Patient has the following on her problem list:     Asthma review     ACT Total Scores 10/22/2018   ACT TOTAL SCORE -   ASTHMA ER VISITS -   ASTHMA HOSPITALIZATIONS -   ACT TOTAL SCORE (Goal Greater than or Equal to 20) 18   In the past 12 months, how many times did you visit the emergency room for your asthma without being admitted to the hospital? 0   In the past 12 months, how many times were you hospitalized overnight because of your asthma? 0      1. Is Asthma diagnosis on the Problem List? Yes    2. Is Asthma listed on Health Maintenance? Yes    3. Patient is due for:  AAP      Composite cancer screening  Chart review shows that this patient is due/due soon for the following None  Summary:    Patient is due/failing the following:   AAP    Action needed:   Routed to provider for review.    Type of outreach:    NONE    Questions for provider review:    Please do AAP if appropriate.                                                                                                                                     Dalton Olivares CMA on 2/11/2019 at 8:44 AM       Chart routed to Provider .

## 2019-05-14 ENCOUNTER — OFFICE VISIT (OUTPATIENT)
Dept: FAMILY MEDICINE | Facility: CLINIC | Age: 44
End: 2019-05-14
Payer: COMMERCIAL

## 2019-05-14 VITALS
WEIGHT: 142 LBS | SYSTOLIC BLOOD PRESSURE: 130 MMHG | BODY MASS INDEX: 26.83 KG/M2 | RESPIRATION RATE: 16 BRPM | OXYGEN SATURATION: 99 % | DIASTOLIC BLOOD PRESSURE: 80 MMHG | TEMPERATURE: 98.1 F | HEART RATE: 98 BPM

## 2019-05-14 DIAGNOSIS — J45.41 MODERATE PERSISTENT ASTHMA WITH ACUTE EXACERBATION: ICD-10-CM

## 2019-05-14 PROCEDURE — 99214 OFFICE O/P EST MOD 30 MIN: CPT | Performed by: FAMILY MEDICINE

## 2019-05-14 RX ORDER — PREDNISONE 10 MG/1
TABLET ORAL
Qty: 20 TABLET | Refills: 0 | Status: SHIPPED | OUTPATIENT
Start: 2019-05-14 | End: 2019-06-18

## 2019-05-14 RX ORDER — ALBUTEROL SULFATE 90 UG/1
2 AEROSOL, METERED RESPIRATORY (INHALATION) EVERY 6 HOURS PRN
Qty: 18 G | Refills: 3 | Status: SHIPPED | OUTPATIENT
Start: 2019-05-14 | End: 2019-08-12

## 2019-05-14 NOTE — PROGRESS NOTES
SUBJECTIVE:   Di Higgins is a 43 year old female who presents to clinic today for the following   health issues:    Asthma Follow-Up  Doing Advair: inhaler  Albuterol inhaler and neb when bad.  Last few days wheezing has worsened with cogh.    Was ACT completed today?  No      Respiratory symptoms:   Cough: Yes- dry   Wheezing: Yes-    Shortness of breath: Yes-     Use of short- acting(rescue) inhaler: yes    Taking controlled (daily) meds as prescribed: Yes    ER/UC visits or hospital admissions since last visit: none     Recent asthma triggers that patient is dealing with:a lot of things     ACT Total Scores 10/22/2018 10/22/2018 5/14/2019   ACT TOTAL SCORE - - -   ASTHMA ER VISITS - - -   ASTHMA HOSPITALIZATIONS - - -   ACT TOTAL SCORE (Goal Greater than or Equal to 20) 19 18 9   In the past 12 months, how many times did you visit the emergency room for your asthma without being admitted to the hospital? 0 0 0   In the past 12 months, how many times were you hospitalized overnight because of your asthma? 0 0 0       Amount of exercise or physical activity: walking    Problems taking medications regularly: No    Medication side effects: none    Diet: regular (no restrictions)    Preventive:    Due    Additional history: as documented    Reviewed and updated as needed this visit by Provider    Patient Active Problem List   Diagnosis     GERD (gastroesophageal reflux disease)     Seasonal allergic rhinitis     Allergic rhinitis due to animal dander     House dust mite allergy     Rhinitis, allergic to other allergen     Vitamin D deficiency     Moderate persistent asthma     CARDIOVASCULAR SCREENING; LDL GOAL LESS THAN 160     Diagnostic skin and sensitization tests     Nonintractable paroxysmal hemicrania     Past Surgical History:   Procedure Laterality Date     HYSTERECTOMY, PAP NO LONGER INDICATED  12/09/2016     HYSTERECTOMY, VAGINAL  12/09/2016    uterine leiomyoma, adenomyosis     TUBAL LIGATION  1999     laparoscopic tubal       Social History     Tobacco Use     Smoking status: Former Smoker     Smokeless tobacco: Former User     Quit date: 1/2/1985   Substance Use Topics     Alcohol use: Yes     Comment: occasional     Family History   Problem Relation Age of Onset     Asthma Father      Neurologic Disorder Sister         MS         ROS:  Constitutional, cardiovascular, gi and gu systems are negative, except as otherwise noted.    OBJECTIVE:     /80   Pulse 98   Temp 98.1  F (36.7  C)   Resp 16   Wt 64.4 kg (142 lb)   LMP 11/10/2016   SpO2 99%   BMI 26.83 kg/m    Body mass index is 26.83 kg/m .  GENERAL: healthy, alert and no distress  EYES: Eyes grossly normal to inspection, PERRL and conjunctivae and sclerae normal  HENT: ear canals and TM's normal, nose and mouth without ulcers or lesions  NECK: no adenopathy and thyroid normal to palpation  RESP:  occasional wheezing - no rales, rhonchi  CV: regular rate and rhythm, no murmur, click or rub, no peripheral edema  MS: no gross musculoskeletal defects noted, no edema    Diagnostic Test Results:  none     ASSESSMENT/PLAN:     (J45.41) Moderate persistent asthma with acute exacerbation  Comment: ACT score 9. Asthma exacerbation associated with seasonal allergies. Discussed associated allergies, anti histamine and prednisone taper dose. .   Plan: predniSONE (DELTASONE) 10 MG tablet, albuterol         (PROAIR HFA/PROVENTIL HFA/VENTOLIN HFA) 108 (90        Base) MCG/ACT inhaler    Follow up in 6 months or sooner with concerns    Amilcar Acosta MD  [facility]

## 2019-05-14 NOTE — PATIENT INSTRUCTIONS
[facility]    If you have any questions regarding to your visit please contact your care team:       Team Purple:   Clinic Hours Telephone Number   Dr Darcy Stewart PA   7am-7pm  Monday - Thursday   7am-5pm  Fridays  [phone]  (Appointment scheduling available 24/7)    Questions about your recent visit?   Team Line:  [phone]   Urgent Care - [place] and [facility] - 11am-9pm Monday-Friday Saturday-Sunday- 9am-5pm   [place] - 5pm-9pm Monday-Friday Saturday-Sunday- 9am-5pm  [phone] - [place]  [phone] - [place]       What options do I have for a visit other than an office visit? We offer electronic visits (e-visits) and telephone visits, when medically appropriate.  Please check with your medical insurance to see if these types of visits are covered, as you will be responsible for any charges that are not paid by your insurance.      You can use [employer] (secure electronic communication) to access to your chart, send your primary care provider a message, or make an appointment. Ask a team member how to get started.     For a price quote for your services, please call our Consumer Price Line at [phone] or our Imaging Cost estimation line at [phone] (for imaging tests).

## 2019-05-15 ASSESSMENT — ASTHMA QUESTIONNAIRES: ACT_TOTALSCORE: 9

## 2019-05-21 ENCOUNTER — TELEPHONE (OUTPATIENT)
Dept: SCHEDULING | Facility: CLINIC | Age: 44
End: 2019-05-21

## 2019-05-21 ENCOUNTER — TELEPHONE (OUTPATIENT)
Dept: FAMILY MEDICINE | Facility: CLINIC | Age: 44
End: 2019-05-21

## 2019-05-21 NOTE — TELEPHONE ENCOUNTER
Patient was seen on 05/14/19 failed ACT with a score of 9. Please initiate failed ACT work flow. Concepcion Troy MA

## 2019-06-17 ENCOUNTER — TRANSFERRED RECORDS (OUTPATIENT)
Dept: HEALTH INFORMATION MANAGEMENT | Facility: CLINIC | Age: 44
End: 2019-06-17

## 2019-06-17 LAB
ASTHMA CONTROL TEST SCORE: 15
ER ASTHMA: 0
HOSPITALIZATION ASTHMA: 0

## 2019-08-12 ENCOUNTER — TELEPHONE (OUTPATIENT)
Dept: FAMILY MEDICINE | Facility: CLINIC | Age: 44
End: 2019-08-12

## 2019-08-12 DIAGNOSIS — J30.81 ALLERGIC RHINITIS DUE TO ANIMAL DANDER: ICD-10-CM

## 2019-08-12 DIAGNOSIS — J45.40 MODERATE PERSISTENT ASTHMA WITHOUT COMPLICATION: ICD-10-CM

## 2019-08-12 DIAGNOSIS — J45.41 MODERATE PERSISTENT ASTHMA WITH ACUTE EXACERBATION: ICD-10-CM

## 2019-08-12 RX ORDER — ALBUTEROL SULFATE 90 UG/1
2 AEROSOL, METERED RESPIRATORY (INHALATION) EVERY 6 HOURS
Qty: 36 G | Refills: 5 | Status: SHIPPED | OUTPATIENT
Start: 2019-08-12 | End: 2020-10-21

## 2019-08-12 RX ORDER — ALBUTEROL SULFATE 90 UG/1
2 AEROSOL, METERED RESPIRATORY (INHALATION) EVERY 6 HOURS PRN
Qty: 36 G | Refills: 0 | Status: SHIPPED | OUTPATIENT
Start: 2019-08-12 | End: 2019-08-12 | Stop reason: DRUGHIGH

## 2019-08-12 NOTE — TELEPHONE ENCOUNTER
Spoke with pharmacy the albuterol inhaler is written for 18g- this is a one month supply. Prescription pending for 2 inhalers per patient request.  She takes prednisone for her asthma flares and it is effective.  The last time she took prednisone she noted that she had chest pain on the right side about an hour after taking, some times the pain lasted other times it resolved quickly. The medication helps her breathing though.  Please advise   Katey Lr RN

## 2019-08-12 NOTE — TELEPHONE ENCOUNTER
We do not give prednisone for 3 month; usually given for asthma flare up only. Prolonged use of prednisone has the potential to cause serious side effects including diabetes, thinning of the bones and increasing risk of infections, esophagitis, pancreatitis and a host of other side effects; that is why we do short courses. Corticosteroid inhaler (advair) is safer  Will refill albuterol as requested.

## 2019-08-12 NOTE — TELEPHONE ENCOUNTER
Called patient and she wants more prednisone filled stating she gets enough for 3 months but don't know why its only for a month. For the Albuterol, she has been getting two in one box but now getting one in a box and wants to know why that is happening. Patient is most concern right now about chest pain. Been going on for the past three days mostly on her right side.  Dalton Olivares CMA on 8/12/2019 at 12:53 PM

## 2019-08-12 NOTE — TELEPHONE ENCOUNTER
Reason for call:  Other   Patient called regarding (reason for call): call back  Additional comments: Patient is calling because she has some questions about the medication albuterol (PROAIR HFA/PROVENTIL HFA/VENTOLIN HFA) 108 (90 Base) MCG/ACT inhaler and the predniSONE (DELTASONE) 10 MG tablet. Please call back     Phone number to reach patient:  Other phone number:  [phone]    Best Time:  any    Can we leave a detailed message on this number?  YES

## 2019-08-13 NOTE — TELEPHONE ENCOUNTER
Detailed message left for patient with providers message as written.  Also advised if still having problems with chest pain to schedule a visit. Advised to call back RN hotline ([phone]) with any questions.   Katey Lr RN

## 2019-08-13 NOTE — TELEPHONE ENCOUNTER
Patient returned the call,  She is currently taking prednisone from the order on 6/18/19.  She reports that provider knows she will only taking 10mg of prednisone per day due to side effects on higher dosages so she didn't take the medication as ordered. When she felt like she was having a flare last month she started taking 10mg daily for the 20 doses.  Her last dose was on 8/9/19.  She will follow up regarding the chest pain- visit scheduled on 8/19/19 (only wants to see PCP).  She has a cough and congestion right now and it started right after she finished the prednisone, she is wondering if the prednisone caused this?  She is really tired and would like to go home but would like a work note (Fax # [phone]). No appointment openings today.  Please advise on work note  Katey Lr RN

## 2019-08-14 ENCOUNTER — OFFICE VISIT (OUTPATIENT)
Dept: URGENT CARE | Facility: URGENT CARE | Age: 44
End: 2019-08-14
Payer: COMMERCIAL

## 2019-08-14 ENCOUNTER — ANCILLARY PROCEDURE (OUTPATIENT)
Dept: GENERAL RADIOLOGY | Facility: CLINIC | Age: 44
End: 2019-08-14
Attending: NURSE PRACTITIONER
Payer: COMMERCIAL

## 2019-08-14 VITALS
SYSTOLIC BLOOD PRESSURE: 167 MMHG | TEMPERATURE: 98.5 F | HEART RATE: 83 BPM | DIASTOLIC BLOOD PRESSURE: 88 MMHG | OXYGEN SATURATION: 98 %

## 2019-08-14 DIAGNOSIS — J20.9 ACUTE BRONCHITIS, UNSPECIFIED ORGANISM: ICD-10-CM

## 2019-08-14 DIAGNOSIS — R06.02 SOB (SHORTNESS OF BREATH): Primary | ICD-10-CM

## 2019-08-14 PROCEDURE — 71046 X-RAY EXAM CHEST 2 VIEWS: CPT

## 2019-08-14 PROCEDURE — 99214 OFFICE O/P EST MOD 30 MIN: CPT | Performed by: NURSE PRACTITIONER

## 2019-08-14 RX ORDER — AZITHROMYCIN 250 MG/1
TABLET, FILM COATED ORAL
Qty: 6 TABLET | Refills: 0 | Status: SHIPPED | OUTPATIENT
Start: 2019-08-14 | End: 2021-03-31

## 2019-08-14 ASSESSMENT — ENCOUNTER SYMPTOMS
SORE THROAT: 1
SINUS PRESSURE: 1
HEADACHES: 0
RHINORRHEA: 1
DIARRHEA: 0
NAUSEA: 0
CHEST TIGHTNESS: 1
COUGH: 1
CHILLS: 0
VOMITING: 0
WHEEZING: 1
SHORTNESS OF BREATH: 1
FEVER: 0

## 2019-08-14 NOTE — PROGRESS NOTES
SUBJECTIVE:   Di Higgins is a 43 year old female presenting with a chief complaint of   Chief Complaint   Patient presents with     Flu     Patient states that she was exposed to flu.       She is an established patient of [place].    URI Adult    Onset of symptoms was 7 days ago.  Course of illness is worsening.    Severity moderate  Current and Associated symptoms: runny nose, stuffy nose, cough - productive, wheezing, shortness of breath, sore throat, chest tightness and facial pain/pressure  Treatment measures tried include Inhaler (name: albuterol) and Nebulizer (name: albuterol).  Predisposing factors include HX of asthma, exposure to influenza at work.      Review of Systems   Constitutional: Negative for chills and fever.   HENT: Positive for congestion, rhinorrhea, sinus pressure and sore throat. Negative for ear pain.    Respiratory: Positive for cough, chest tightness, shortness of breath and wheezing.    Gastrointestinal: Negative for diarrhea, nausea and vomiting.   Neurological: Negative for headaches.   All other systems reviewed and are negative.      Past Medical History:   Diagnosis Date     Allergic rhinitis due to animal dander      Diagnostic skin and sensitization tests 4/11 IgE tests pos. for:  pos. for: cat/dog(+)/DM/M/T/RW    tests per [facility]     GERD (gastroesophageal reflux disease)     4/11 visit, GERD with epigastric pain--Neg. H. Pylori     House dust mite allergy      Moderate persistent asthma 4/11 OsL=282    at new pt visit of 4/11, pt admited no controller meds x 1 yr.     Rhinitis, allergic to other allergen      Seasonal allergic rhinitis     4/11 IgE tests pos. for: cat/dog(+)/DM/M/T/RW     Vitamin D deficiency 4/11 noted--started on supplement     Family History   Problem Relation Age of Onset     Asthma Father      Neurologic Disorder Sister         MS     Current Outpatient Medications   Medication Sig Dispense Refill     albuterol (2.5 MG/3ML) 0.083% nebulizer solution  Take 1 vial (2.5 mg) by nebulization every 6 hours as needed for shortness of breath / dyspnea or wheezing 1 Box 4     albuterol (PROAIR HFA/PROVENTIL HFA/VENTOLIN HFA) 108 (90 Base) MCG/ACT inhaler Inhale 2 puffs into the lungs every 6 hours 36 g 5     azithromycin (ZITHROMAX) 250 MG tablet Two tablets first day, then one tablet daily for four days. 6 tablet 0     fluticasone-salmeterol (ADVAIR) 250-50 MCG/DOSE inhaler Inhale 1 puff into the lungs 2 times daily 3 Inhaler 0     Hypertonic Nasal Wash (SINUS RINSE BOTTLE KIT NA) Spray 1 Bottle in nostril daily as needed.       ibuprofen (ADVIL,MOTRIN) 600 MG tablet Take 1 tablet (600 mg) by mouth every 6 hours as needed for moderate pain 30 tablet 1     ondansetron (ZOFRAN) 8 MG tablet Take 1 tablet (8 mg) by mouth every 8 hours as needed for nausea 20 tablet 0     order for DME Equipment being ordered: Nebulizer 1 Device 0     predniSONE (DELTASONE) 10 MG tablet Take 40 mg qd x 2 days, 30 mg qd x 2 days, 20 mg qd x 2 days, 10 mg qd x 2 days. 20 tablet 0     valACYclovir (VALTREX) 1000 mg tablet Take 1 tablet (1,000 mg) by mouth 3 times daily 21 tablet 0     Social History     Tobacco Use     Smoking status: Former Smoker     Smokeless tobacco: Former User     Quit date: 1/2/1985   Substance Use Topics     Alcohol use: Yes     Comment: occasional       OBJECTIVE  BP (!) 167/88 (BP Location: Left arm, Patient Position: Chair, Cuff Size: Adult Regular)   Pulse 83   Temp 98.5  F (36.9  C) (Oral)   LMP 11/10/2016   SpO2 98%     Physical Exam   Constitutional: She appears well-developed and well-nourished. No distress.   HENT:   Head: Normocephalic and atraumatic.   Right Ear: Tympanic membrane and external ear normal.   Left Ear: Tympanic membrane and external ear normal.   Nose: Mucosal edema and rhinorrhea present.   Mouth/Throat: Oropharynx is clear and moist.   Eyes: Pupils are equal, round, and reactive to light. EOM are normal.   Neck: Normal range of motion.  Neck supple.   Pulmonary/Chest: Effort normal. No respiratory distress. She has rhonchi (Clears with cough) in the right upper field and the right middle field.   Lymphadenopathy:     She has no cervical adenopathy.   Neurological: She is alert. No cranial nerve deficit.   Skin: Skin is warm and dry. She is not diaphoretic.   Psychiatric: She has a normal mood and affect.   Nursing note and vitals reviewed.    Results for orders placed or performed in visit on 08/14/19   XR Chest 2 Views    Narrative    CHEST TWO VIEWS 8/14/2019 2:24 PM     HISTORY: Cough. SOB (shortness of breath).    COMPARISON: None.    FINDINGS: Heart size and pulmonary vascularity are within normal  limits. The lungs are clear. No pneumothorax or pleural effusion.       Impression    IMPRESSION: No radiographic evidence of acute chest abnormality.     PATSY SKAGGS MD       ASSESSMENT:      ICD-10-CM    1. SOB (shortness of breath) R06.02 XR Chest 2 Views   2. Acute bronchitis, unspecified organism J20.9 azithromycin (ZITHROMAX) 250 MG tablet        Serious Comorbid Conditions:  Adult:  Asthma    PLAN:  Albuterol every 4 hours for the next 48 hours, then as needed.  Antibiotics as prescribed.   I recommend follow up with PCP in 3 days or sooner if symptoms are getting worse  Side effects of medications discussed  Over the counter medications discussed  All questions are answered and patient is in agreement with treatment plan  Bettina Bermudez  [facility]  Family Nurse Practitoner            Patient Instructions       Patient Education     Acute Bronchitis  Your healthcare provider has told you that you have acute bronchitis. Bronchitis is infection or inflammation of the bronchial tubes (airways in the lungs). Normally, air moves easily in and out of the airways. Bronchitis narrows the airways, making it harder for air to flow in and out of the lungs. This causes symptoms such as shortness of breath, coughing up yellow or green mucus, and wheezing.  Bronchitis can be acute or chronic. Acute means the condition comes on quickly and goes away in a short time, usually within 3 to 10 days. Chronic means a condition lasts a long time and often comes back.    What causes acute bronchitis?  Acute bronchitis almost always starts as a viral respiratory infection, such as a cold or the flu. Certain factors make it more likely for a cold or flu to turn into bronchitis. These include being very young, being elderly, having a heart or lung problem, or having a weak immune system. Cigarette smoking also makes bronchitis more likely.  When bronchitis develops, the airways become swollen. The airways may also become infected with bacteria. This is known as a secondary infection.  Diagnosing acute bronchitis  Your healthcare provider will examine you and ask about your symptoms and health history. You may also have a sputum culture to test the fluid in your lungs. Chest X-rays may be done to look for infection in the lungs.  Treating acute bronchitis  Bronchitis usually clears up as the cold or flu goes away. You can help feel better faster by doing the following:    Take medicine as directed. You may be told to take ibuprofen or other over-the-counter medicines. These help relieve inflammation in your bronchial tubes. Your healthcare provider may prescribe an inhaler to help open up the bronchial tubes. Most of the time, acute bronchitis is caused by a viral infection. Antibiotics are usually not prescribed for viral infections.    Drink plenty of fluids, such as water, juice, or warm soup. Fluids loosen mucus so that you can cough it up. This helps you breathe more easily. Fluids also prevent dehydration.    Make sure you get plenty of rest.    Do not smoke. Do not allow anyone else to smoke in your home.  Recovery and follow-up  Follow up with your doctor as you are told. You will likely feel better in a week or two. But a dry cough can linger beyond that time. Let your  doctor know if you still have symptoms (other than a dry cough) after 2 weeks, or if you re prone to getting bronchial infections. Take steps to protect yourself from future infections. These steps include stopping smoking and avoiding tobacco smoke, washing your hands often, and getting a yearly flu shot.  When to call your healthcare provider  Call the healthcare provider if you have any of the following:    Fever of 100.4 F (38.0 C) or higher, or as advised    Symptoms that get worse, or new symptoms    Trouble breathing    Symptoms that don t start to improve within a week, or within 3 days of taking antibiotics   Date Last Reviewed: 12/1/2016 2000-2018 [employer]. [address] [place] [address], [place] [address]. All rights reserved. This information is not intended as a substitute for professional medical care. Always follow your healthcare professional's instructions.

## 2019-08-14 NOTE — TELEPHONE ENCOUNTER
Left message on answering machine for patient to call back to the RN hotline at [phone].    Tawana Tang RN  [facility]

## 2019-08-14 NOTE — PATIENT INSTRUCTIONS
Patient Education     Acute Bronchitis  Your healthcare provider has told you that you have acute bronchitis. Bronchitis is infection or inflammation of the bronchial tubes (airways in the lungs). Normally, air moves easily in and out of the airways. Bronchitis narrows the airways, making it harder for air to flow in and out of the lungs. This causes symptoms such as shortness of breath, coughing up yellow or green mucus, and wheezing. Bronchitis can be acute or chronic. Acute means the condition comes on quickly and goes away in a short time, usually within 3 to 10 days. Chronic means a condition lasts a long time and often comes back.    What causes acute bronchitis?  Acute bronchitis almost always starts as a viral respiratory infection, such as a cold or the flu. Certain factors make it more likely for a cold or flu to turn into bronchitis. These include being very young, being elderly, having a heart or lung problem, or having a weak immune system. Cigarette smoking also makes bronchitis more likely.  When bronchitis develops, the airways become swollen. The airways may also become infected with bacteria. This is known as a secondary infection.  Diagnosing acute bronchitis  Your healthcare provider will examine you and ask about your symptoms and health history. You may also have a sputum culture to test the fluid in your lungs. Chest X-rays may be done to look for infection in the lungs.  Treating acute bronchitis  Bronchitis usually clears up as the cold or flu goes away. You can help feel better faster by doing the following:    Take medicine as directed. You may be told to take ibuprofen or other over-the-counter medicines. These help relieve inflammation in your bronchial tubes. Your healthcare provider may prescribe an inhaler to help open up the bronchial tubes. Most of the time, acute bronchitis is caused by a viral infection. Antibiotics are usually not prescribed for viral infections.    Drink  plenty of fluids, such as water, juice, or warm soup. Fluids loosen mucus so that you can cough it up. This helps you breathe more easily. Fluids also prevent dehydration.    Make sure you get plenty of rest.    Do not smoke. Do not allow anyone else to smoke in your home.  Recovery and follow-up  Follow up with your doctor as you are told. You will likely feel better in a week or two. But a dry cough can linger beyond that time. Let your doctor know if you still have symptoms (other than a dry cough) after 2 weeks, or if you re prone to getting bronchial infections. Take steps to protect yourself from future infections. These steps include stopping smoking and avoiding tobacco smoke, washing your hands often, and getting a yearly flu shot.  When to call your healthcare provider  Call the healthcare provider if you have any of the following:    Fever of 100.4 F (38.0 C) or higher, or as advised    Symptoms that get worse, or new symptoms    Trouble breathing    Symptoms that don t start to improve within a week, or within 3 days of taking antibiotics   Date Last Reviewed: 12/1/2016 2000-2018 [employer]. [address] [place] [address], [place] [address]. All rights reserved. This information is not intended as a substitute for professional medical care. Always follow your healthcare professional's instructions.

## 2019-08-14 NOTE — LETTER
[facility]  [address]  [facility] [address]  Phone: [phone]    August 14, 2019        Di Higgins  [address]  [facility] [id number]          To whom it may concern:    RE: Di Higgins    Patient was seen and treated today at our clinic and missed work. Please allow her to rest home 8/14/2019 and 8/15/2019.   Patient may return to work 8/17/2019 with no restriction.   Please contact me for questions or concerns.      Sincerely,        Bettina Bermudez NP, APRN

## 2019-08-16 NOTE — PROGRESS NOTES
Subjective     Di Higgins is a 43 year old female who presents to clinic today for the following health issues:    HPI   Asthma Follow-Up    Was ACT completed today?    Yes    ACT Total Scores 8/19/2019   ACT TOTAL SCORE -   ASTHMA ER VISITS -   ASTHMA HOSPITALIZATIONS -   ACT TOTAL SCORE (Goal Greater than or Equal to 20) 9   In the past 12 months, how many times did you visit the emergency room for your asthma without being admitted to the hospital? 1   In the past 12 months, how many times were you hospitalized overnight because of your asthma? 0       How many days per week do you miss taking your asthma controller medication?  0    Please describe any recent triggers for your asthma: None    Have you had any Emergency Room Visits, Urgent Care Visits, or Hospital Admissions since your last office visit?  Yes  Number of ER or Urgent Care visits for asthma: SHAWANDA mcmahon on 8/14/2019    Medications:  1. Albuterol inhaler : Been using 4-6 times a day  2. Albuterol neb: occasional at night  3. Advair (250-50)  4. Claritin  Been taking prednisone, periodically.    Side effect on medication on from UC (antibiotic) is giving patient very loose stool and super sweating. Prednisone helps and has been taking low dose on and off.  At her job there mold in the air conditioner.  Symptoms same at home and work. Weekend abit better.      How many servings of fruits and vegetables do you eat daily?  2-3    On average, how many sweetened beverages do you drink each day (soda, juice, sweet tea, etc)?   1    How many days per week do you miss taking your medication? 0      Patient Active Problem List   Diagnosis     GERD (gastroesophageal reflux disease)     Seasonal allergic rhinitis     Allergic rhinitis due to animal dander     House dust mite allergy     Rhinitis, allergic to other allergen     Vitamin D deficiency     Moderate persistent asthma     CARDIOVASCULAR SCREENING; LDL GOAL LESS THAN 160     Diagnostic skin and  "sensitization tests     Nonintractable paroxysmal hemicrania     Past Surgical History:   Procedure Laterality Date     HYSTERECTOMY, PAP NO LONGER INDICATED  12/09/2016     HYSTERECTOMY, VAGINAL  12/09/2016    uterine leiomyoma, adenomyosis     TUBAL LIGATION  1999    laparoscopic tubal       Social History     Tobacco Use     Smoking status: Former Smoker     Smokeless tobacco: Former User     Quit date: 1/2/1985   Substance Use Topics     Alcohol use: Yes     Comment: occasional     Family History   Problem Relation Age of Onset     Asthma Father      Neurologic Disorder Sister         MS         PROBLEMS TO ADD ON...  Reviewed and updated as needed this visit by Provider    Review of Systems   HEENT, cardiovascular, gi and gu systems are negative, except as otherwise noted.      Objective    /84   Pulse 103   Temp 98  F (36.7  C) (Oral)   Resp 16   Ht 1.579 m (5' 2.17\")   Wt 64.6 kg (142 lb 8 oz)   LMP 11/10/2016   SpO2 98%   BMI 25.93 kg/m    Body mass index is 25.93 kg/m .  Physical Exam   GENERAL: healthy, alert and no distress  NECK: no adenopathy and thyroid normal to palpation  RESP: feeling congested, lungs with occasional wheezes  CV: regular rate and rhythm, normal S1 S2, no S3 or S4, no murmur, click or rub  ABDOMEN: soft, nontender, no masses and bowel sounds normal  MS: no gross musculoskeletal defects noted, no edema    Diagnostic Test Results:  Labs reviewed in Epic        Assessment & Plan     Di was seen today for asthma.    Diagnoses and all orders for this visit:    Moderate persistent asthma with exacerbation    ACT score 9. Poorly controlled, possibly due to environmental exposures/allergies. Discussed the side effects of long term use of prednisone and recommended following up with allergist for evaluation. No been abl to sleep well and feeling very fatigued, rest for 2 days appropriate.  -     ALLERGY/ASTHMA ADULT REFERRAL    Sinus congestion     Allergy related. " Decongestant.  -     azelastine (ASTELIN) 0.1 % nasal spray; Spray 1 spray into both nostrils 2 times daily    Follow up in 1 month or sooner with concerns    Amilcar Acosta MD  [facility]

## 2019-08-19 ENCOUNTER — TELEPHONE (OUTPATIENT)
Dept: FAMILY MEDICINE | Facility: CLINIC | Age: 44
End: 2019-08-19

## 2019-08-19 ENCOUNTER — OFFICE VISIT (OUTPATIENT)
Dept: FAMILY MEDICINE | Facility: CLINIC | Age: 44
End: 2019-08-19
Payer: COMMERCIAL

## 2019-08-19 VITALS
HEART RATE: 103 BPM | BODY MASS INDEX: 26.22 KG/M2 | DIASTOLIC BLOOD PRESSURE: 84 MMHG | RESPIRATION RATE: 16 BRPM | WEIGHT: 142.5 LBS | SYSTOLIC BLOOD PRESSURE: 132 MMHG | TEMPERATURE: 98 F | HEIGHT: 62 IN | OXYGEN SATURATION: 98 %

## 2019-08-19 DIAGNOSIS — R09.81 SINUS CONGESTION: ICD-10-CM

## 2019-08-19 DIAGNOSIS — J45.41 MODERATE PERSISTENT ASTHMA WITH EXACERBATION: Primary | ICD-10-CM

## 2019-08-19 PROCEDURE — 99214 OFFICE O/P EST MOD 30 MIN: CPT | Performed by: FAMILY MEDICINE

## 2019-08-19 RX ORDER — AZELASTINE 1 MG/ML
1 SPRAY, METERED NASAL 2 TIMES DAILY
Qty: 1 BOTTLE | Refills: 0 | Status: SHIPPED | OUTPATIENT
Start: 2019-08-19 | End: 2021-03-31

## 2019-08-19 ASSESSMENT — MIFFLIN-ST. JEOR: SCORE: 1252.25

## 2019-08-19 NOTE — LETTER
My Asthma Action Plan  Name: Di Higgins   YOB: 1975  Date: 8/19/2019   My doctor: Amilcar Acosta MD   My clinic: [facility]        My Control Medicine: Fluticasone + salmeterol (Advair) -  Diskus 250/50 mcg .  My Rescue Medicine: Albuterol (Proair/Ventolin/Proventil) inhaler .  My Oral Steroid Medicine: Prednisone My Asthma Severity: moderate persistent  Avoid your asthma triggers: dust mites, pollens, animal dander, mold, humidity, strong odors and fumes, exercise or sports and cold air  None            GREEN ZONE   Good Control    I feel good    No cough or wheeze    Can work, sleep and play without asthma symptoms       Take your asthma control medicine every day.     1. If exercise triggers your asthma, take your rescue medication    15 minutes before exercise or sports, and    During exercise if you have asthma symptoms  2. Spacer to use with inhaler: If you have a spacer, make sure to use it with your inhaler             YELLOW ZONE Getting Worse  I have ANY of these:    I do not feel good    Cough or wheeze    Chest feels tight    Wake up at night   1. Keep taking your Green Zone medications  2. Start taking your rescue medicine:    every 20 minutes for up to 1 hour. Then every 4 hours for 24-48 hours.  3. If you stay in the Yellow Zone for more than 12-24 hours, contact your doctor.  4. If you do not return to the Green Zone in 12-24 hours or you get worse, start taking your oral steroid medicine if prescribed by your provider.           RED ZONE Medical Alert - Get Help  I have ANY of these:    I feel awful    Medicine is not helping    Breathing getting harder    Trouble walking or talking    Nose opens wide to breathe       1. Take your rescue medicine NOW  2. If your provider has prescribed an oral steroid medicine, start taking it NOW  3. Call your doctor NOW  4. If you are still in the Red Zone after 20 minutes and you have not reached your doctor:    Take your  rescue medicine again and    Call 911 or go to the emergency room right away    See your regular doctor within 2 weeks of an Emergency Room or Urgent Care visit for follow-up treatment.          Annual Reminders:  Meet with Asthma Educator,  Flu Shot in the Fall, consider Pneumonia Vaccination for patients with asthma (aged 19 and older).    Pharmacy:    [id number] - [place], [place] - [address] AT [facility] [address]  [id number] - [facility], [place] - [address] [facility] AT [facility]                      Asthma Triggers  How To Control Things That Make Your Asthma Worse    Triggers are things that make your asthma worse.  Look at the list below to help you find your triggers and what you can do about them.  You can help prevent asthma flare-ups by staying away from your triggers.      Trigger                                                          What you can do   Cigarette Smoke  Tobacco smoke can make asthma worse. Do not allow smoking in your home, car or around you.  Be sure no one smokes at a child s day care or school.  If you smoke, ask your health care provider for ways to help you quit.  Ask family members to quit too.  Ask your health care provider for a referral to Quit Plan to help you quit smoking, or call 4-330-933-PLAN.     Colds, Flu, Bronchitis  These are common triggers of asthma. Wash your hands often.  Don t touch your eyes, nose or mouth.  Get a flu shot every year.     Dust Mites  These are tiny bugs that live in cloth or carpet. They are too small to see. Wash sheets and blankets in hot water every week.   Encase pillows and mattress in dust mite proof covers.  Avoid having carpet if you can. If you have carpet, vacuum weekly.   Use a dust mask and HEPA vacuum.   Pollen and Outdoor Mold  Some people are allergic to trees, grass, or weed pollen, or molds. Try to keep your windows closed.  Limit time out doors when pollen count is  high.   Ask you health care provider about taking medicine during allergy season.     Animal Dander  Some people are allergic to skin flakes, urine or saliva from pets with fur or feathers. Keep pets with fur or feathers out of your home.    If you can t keep the pet outdoors, then keep the pet out of your bedroom.  Keep the bedroom door closed.  Keep pets off cloth furniture and away from stuffed toys.     Mice, Rats, and Cockroaches  Some people are allergic to the waste from these pests.   Cover food and garbage.  Clean up spills and food crumbs.  Store grease in the refrigerator.   Keep food out of the bedroom.   Indoor Mold  This can be a trigger if your home has high moisture. Fix leaking faucets, pipes, or other sources of water.   Clean moldy surfaces.  Dehumidify basement if it is damp and smelly.   Smoke, Strong Odors, and Sprays  These can reduce air quality. Stay away from strong odors and sprays, such as perfume, powder, hair spray, paints, smoke incense, paint, cleaning products, candles and new carpet.   Exercise or Sports  Some people with asthma have this trigger. Be active!  Ask your doctor about taking medicine before sports or exercise to prevent symptoms.    Warm up for 5-10 minutes before and after sports or exercise.     Other Triggers of Asthma  Cold air:  Cover your nose and mouth with a scarf.  Sometimes laughing or crying can be a trigger.  Some medicines and food can trigger asthma.

## 2019-08-19 NOTE — TELEPHONE ENCOUNTER
Patient notified of providers message as written.  She will be seen at her scheduled time.   Katey Lr RN

## 2019-08-19 NOTE — TELEPHONE ENCOUNTER
I can use my slot for chart review but that will be at 4.20 pm and already [facility] been slotted so cannot double book

## 2019-08-19 NOTE — TELEPHONE ENCOUNTER
Patient is scheduled for visit at 3:20pm today.  She was seen at  on 8/14/19 for SOB and acute bronchitis but her symptoms are not any better. She would like to be seen sooner by PCP- advised there are no other openings with PCP today.  She would like to be squeezed in for a sooner visit- advised that provider schedule is full so this is unlikely but patient would like a message sent.  Please advise   Katey Lr RN

## 2019-08-19 NOTE — LETTER
August 19, 2019      Di Higgins  [address]  [place] [address]        To Whom It May Concern:    Di Higgins was seen in our clinic. She may return to work 8/22/2019 without restrictions.      Sincerely,        Amilcar Acosta MD

## 2019-08-20 ASSESSMENT — ASTHMA QUESTIONNAIRES: ACT_TOTALSCORE: 9

## 2019-08-30 ENCOUNTER — TELEPHONE (OUTPATIENT)
Dept: FAMILY MEDICINE | Facility: CLINIC | Age: 44
End: 2019-08-30
Payer: COMMERCIAL

## 2019-08-30 DIAGNOSIS — Z53.9 ERRONEOUS ENCOUNTER--DISREGARD: Primary | ICD-10-CM

## 2019-09-30 ENCOUNTER — TELEPHONE (OUTPATIENT)
Dept: FAMILY MEDICINE | Facility: CLINIC | Age: 44
End: 2019-09-30

## 2019-09-30 NOTE — TELEPHONE ENCOUNTER
Panel Management Review      Patient has the following on her problem list:     Asthma review     ACT Total Scores 8/19/2019   ACT TOTAL SCORE -   ASTHMA ER VISITS -   ASTHMA HOSPITALIZATIONS -   ACT TOTAL SCORE (Goal Greater than or Equal to 20) 9   In the past 12 months, how many times did you visit the emergency room for your asthma without being admitted to the hospital? 1   In the past 12 months, how many times were you hospitalized overnight because of your asthma? 0      1. Is Asthma diagnosis on the Problem List? Yes    2. Is Asthma listed on Health Maintenance? Yes    3. Patient is due for:  ACT      Composite cancer screening  Chart review shows that this patient is due/due soon for the following None  Summary:    Patient is due/failing the following:   ACT    Action needed:   Patient needs to do ACT.    Type of outreach:    Copy of ACT mailed to patient, will reach out in 5 days.    Questions for provider review:    None                                                                                                                                    Concepcion Troy MA       Chart routed to Care Team .

## 2019-09-30 NOTE — LETTER
September 30, 2019      Di Higgins  [address]  [place] [address]      Dear Di,     Your clinic record indicates that you are due for an asthma update. We have a survey tool called an ACT (or Asthma Control Test) we use to measure the level of control of your asthma. Please complete the enclosed questionnaire and mail it back to us in the self-addressed stamped envelope.     If you have questions about this letter please contact your provider.     Sincerely,      Your [facility]

## 2019-10-10 NOTE — TELEPHONE ENCOUNTER
2nd attempt. Called and left message for patient to return call to Team Lexie.  Dalton Olivares CMA on 10/10/2019 at 8:56 AM

## 2019-11-28 ASSESSMENT — ASTHMA QUESTIONNAIRES: ACT_TOTALSCORE: 17

## 2019-12-06 ENCOUNTER — TELEPHONE (OUTPATIENT)
Dept: FAMILY MEDICINE | Facility: CLINIC | Age: 44
End: 2019-12-06

## 2019-12-06 NOTE — LETTER
December 6, 2019      Di Higgins  [address]  [place] [address]      Dear Di,     Your clinic record indicates that you are due for an asthma update. We have a survey tool called an ACT (or Asthma Control Test) we use to measure the level of control of your asthma. Please complete the enclosed questionnaire and mail it back to us in the self-addressed stamped envelope.     If you have questions about this letter please contact your provider.     Sincerely,    Your [facility]

## 2019-12-06 NOTE — TELEPHONE ENCOUNTER
Panel Management Review      Patient has the following on her problem list:     Asthma review     ACT Total Scores 11/27/2019   ACT TOTAL SCORE -   ASTHMA ER VISITS -   ASTHMA HOSPITALIZATIONS -   ACT TOTAL SCORE (Goal Greater than or Equal to 20) 17   In the past 12 months, how many times did you visit the emergency room for your asthma without being admitted to the hospital? 0   In the past 12 months, how many times were you hospitalized overnight because of your asthma? 0      1. Is Asthma diagnosis on the Problem List? Yes    2. Is Asthma listed on Health Maintenance? Yes    3. Patient is due for:  ACT      Composite cancer screening  Chart review shows that this patient is due/due soon for the following None  Summary:    Patient is due/failing the following:   ACT    Action needed:   Patient needs to do ACT.    Type of outreach:    Sent letter.    Questions for provider review:    None                                                                                                                                    Alicia Jewell [facility]         Chart routed to wilbur .

## 2019-12-31 ENCOUNTER — OFFICE VISIT (OUTPATIENT)
Dept: FAMILY MEDICINE | Facility: CLINIC | Age: 44
End: 2019-12-31

## 2019-12-31 VITALS
OXYGEN SATURATION: 97 % | TEMPERATURE: 97 F | BODY MASS INDEX: 25.51 KG/M2 | WEIGHT: 138.6 LBS | RESPIRATION RATE: 14 BRPM | SYSTOLIC BLOOD PRESSURE: 122 MMHG | HEIGHT: 62 IN | DIASTOLIC BLOOD PRESSURE: 78 MMHG | HEART RATE: 92 BPM

## 2019-12-31 DIAGNOSIS — A08.4 VIRAL GASTROENTERITIS: ICD-10-CM

## 2019-12-31 DIAGNOSIS — B34.9 VIRAL SYNDROME: Primary | ICD-10-CM

## 2019-12-31 DIAGNOSIS — R10.9 FLANK PAIN: ICD-10-CM

## 2019-12-31 LAB
FLUAV+FLUBV AG SPEC QL: NEGATIVE
FLUAV+FLUBV AG SPEC QL: NEGATIVE
SPECIMEN SOURCE: NORMAL

## 2019-12-31 PROCEDURE — 87804 INFLUENZA ASSAY W/OPTIC: CPT | Performed by: FAMILY MEDICINE

## 2019-12-31 PROCEDURE — 99213 OFFICE O/P EST LOW 20 MIN: CPT | Performed by: FAMILY MEDICINE

## 2019-12-31 RX ORDER — ONDANSETRON 4 MG/1
4 TABLET, FILM COATED ORAL EVERY 8 HOURS PRN
Qty: 30 TABLET | Refills: 0 | Status: SHIPPED | OUTPATIENT
Start: 2019-12-31 | End: 2021-04-02

## 2019-12-31 RX ORDER — NAPROXEN 500 MG/1
500 TABLET ORAL 2 TIMES DAILY WITH MEALS
Qty: 30 TABLET | Refills: 0 | Status: SHIPPED | OUTPATIENT
Start: 2019-12-31 | End: 2021-04-02

## 2019-12-31 ASSESSMENT — ASTHMA QUESTIONNAIRES
QUESTION_2 LAST FOUR WEEKS HOW OFTEN HAVE YOU HAD SHORTNESS OF BREATH: ONCE OR TWICE A WEEK
QUESTION_3 LAST FOUR WEEKS HOW OFTEN DID YOUR ASTHMA SYMPTOMS (WHEEZING, COUGHING, SHORTNESS OF BREATH, CHEST TIGHTNESS OR PAIN) WAKE YOU UP AT NIGHT OR EARLIER THAN USUAL IN THE MORNING: NOT AT ALL
QUESTION_5 LAST FOUR WEEKS HOW WOULD YOU RATE YOUR ASTHMA CONTROL: WELL CONTROLLED
QUESTION_1 LAST FOUR WEEKS HOW MUCH OF THE TIME DID YOUR ASTHMA KEEP YOU FROM GETTING AS MUCH DONE AT WORK, SCHOOL OR AT HOME: A LITTLE OF THE TIME
QUESTION_4 LAST FOUR WEEKS HOW OFTEN HAVE YOU USED YOUR RESCUE INHALER OR NEBULIZER MEDICATION (SUCH AS ALBUTEROL): TWO OR THREE TIMES PER WEEK
ACT_TOTALSCORE: 20

## 2019-12-31 ASSESSMENT — MIFFLIN-ST. JEOR: SCORE: 1229.64

## 2019-12-31 NOTE — PATIENT INSTRUCTIONS
"  Patient Education     Viral Gastroenteritis (Adult)    Gastroenteritis is commonly called the \"stomach flu,\" although it has nothing to do with influenza. It is most often caused by a virus that affects the stomach and intestinal tract and usually lasts from 2 to 7 days. Common viruses causing gastroenteritis include norovirus, rotavirus, and hepatitis A. Non-viral causes of gastroenteritis include bacteria, parasites, and toxins.  The danger from repeated vomiting or diarrhea is dehydration. This is the loss of too much fluid from the body. When this occurs, body fluids must be replaced. Antibiotics don't help with this illness because it is usually viral. Simple home treatment will be helpful.  Symptoms of viral gastroenteritis may include:    Watery, loose stools    Stomach pain or abdominal cramps    Fever and chills    Nausea and vomiting    Loss of bowel control    Headache  Home care  Gastroenteritis is transmitted by contact with the stool or vomit of an infected person. This can occur from person to person or from contact with a contaminated surface.  Follow these guidelines when caring for yourself at home:    If symptoms are severe, rest at home for the next 24 hours or until you are feeling better.    Wash your hands with soap and water or use alcohol-based  to prevent the spread of infection. Wash your hands after touching anyone who is sick.    Wash your hands or use alcohol-based  after using the toilet and before meals. Clean the toilet after each use.  Remember these tips when preparing food:    People with diarrhea should not prepare or serve food to others. When preparing foods, wash your hands before and after.    Wash your hands after using cutting boards, countertops, knives, or utensils that have been in contact with raw food.    Dry your hands with a single use towel.    Keep uncooked meats away from cooked and ready-to-eat foods.  Medicine  You may use acetaminophen or " NSAID medicines like ibuprofen or naproxen to control fever unless another medicine was given. If you have chronic liver or kidney disease, talk with your healthcare provider before using these medicines. Also talk with your provider if you've had a stomach ulcer or gastrointestinal bleeding. Don't give aspirin to anyone under 18 years of age who is ill with a fever. It may cause severe liver damage. Don't use NSAIDS is you are already taking one for another condition (like arthritis) or are on aspirin (such as for heart disease or after a stroke).  If medicine for vomiting or diarrhea are prescribed, take these only as directed. Nausea and diarrhea medicines are generally OK unless you have bleeding, fever, or severe abdominal pain.  Diet  Follow these guidelines for food:    Water and liquids are important so you don't get dehydrated. Drink a small amount at a time or suck on ice chips if you are vomiting.    If you eat, avoid fatty, greasy, spicy, or fried foods.    Don't eat dairy if you have diarrhea. This can make diarrhea worse.    Avoid tobacco, alcohol, and caffeine which may worsen symptoms.  During the first 24 hours (the first full day), follow the diet below:    Beverages. Sports drinks, soft drinks without caffeine, ginger ale, mineral water (plain or flavored), decaffeinated tea and coffee. If you are very dehydrated, sports drinks aren't a good choice. They have too much sugar and not enough electrolytes. In this case, commercially available products called oral rehydration solutions, are best.    Soups. Eat clear broth, consommé, and bouillon.    Desserts. Eat gelatin, ice pops, and fruit juice bars.  During the next 24 hours (the second day), you may add the following to the above:    Hot cereal, plain toast, bread, rolls, and crackers    Plain noodles, rice, mashed potatoes, chicken noodle or rice soup    Unsweetened canned fruit (avoid pineapple), bananas    Limit fat intake to less than 15 grams  per day. Do this by avoiding margarine, butter, oils, mayonnaise, sauces, gravies, fried foods, peanut butter, meat, poultry, and fish.    Limit fiber and avoid raw or cooked vegetables, fresh fruits (except bananas), and bran cereals.    Limit caffeine and chocolate. Don't use spices or seasonings other than salt.    Limit dairy products.    Avoid alcohol.  During the next 24 hours:    Gradually resume a normal diet as you feel better and your symptoms improve.    If at any time it starts getting worse again, go back to clear liquids until you feel better.  Follow-up care  Follow up with your healthcare provider, or as advised. Call your provider if you don't get better within 24 hours or if diarrhea lasts more than a week. Also follow up if you are unable to keep down liquids and get dehydrated. If a stool (diarrhea) sample was taken, call as directed for the results.  Call 911  Call 911 if any of these occur:    Trouble breathing    Chest pain    Confused    Severe drowsiness or trouble awakening    Fainting or loss of consciousness    Rapid heart rate    Seizure    Stiff neck  When to seek medical advice  Call your healthcare provider right away if any of these occur:    Abdominal pain that gets worse    Continued vomiting (unable to keep liquids down)    Frequent diarrhea (more than 5 times a day)    Blood in vomit or stool (black or red color)    Dark urine, reduced urine output, or extreme thirst    Weakness or dizziness    Drowsiness    Fever of 100.4 F (38 C) or higher, or as directed by your healthcare provider    New rash  Date Last Reviewed: 6/1/2018 2000-2018 [employer]. [address] [place] [address], [place] [address]. All rights reserved. This information is not intended as a substitute for professional medical care. Always follow your healthcare professional's instructions.

## 2019-12-31 NOTE — LETTER
December 31, 2019      Di Higgins  [address]  [place] [address]        To Whom It May Concern:    Di Higgins was seen in our clinic 12/31/2019, she does not have the flu.  Please excuse her from work today. She may return to work 1/2/2020 without restrictions.      Sincerely,        Ramone Epps MD

## 2019-12-31 NOTE — PROGRESS NOTES
"Laura Higgins is a 44 year old female who presents to clinic today for the following health issues:    HPI   RESPIRATORY SYMPTOMS      Duration: 2 days    Description  rhinorrhea, cough, nausea, stomach ache, diarrhea and headache/migraine    Severity: moderate    Accompanying signs and symptoms: None    History (predisposing factors):  Exposure to influenza from granddaughter     Precipitating or alleviating factors: None    Therapies tried and outcome:  rest and fluids    Vomiting  Headache/migraine - throbbing - frontal  Stomach queeziness, bilateral flank pain without urinary changes  Loose stool  Grandchild had the flu recently  Sweats at night, chills during the day  Runny nose, cough -dry, sneezing  No medications taken  No other systemic signs.    BP Readings from Last 3 Encounters:   12/31/19 122/78   08/19/19 132/84   08/14/19 (!) 167/88    Wt Readings from Last 3 Encounters:   12/31/19 62.9 kg (138 lb 9.6 oz)   08/19/19 64.6 kg (142 lb 8 oz)   05/14/19 64.4 kg (142 lb)                      Reviewed and updated as needed this visit by Provider  Tobacco  Allergies  Meds  Problems  Med Hx  Surg Hx  Fam Hx         Review of Systems   ROS COMP: Constitutional, HEENT, cardiovascular, pulmonary, gi and gu systems are negative, except as otherwise noted.      Objective    /78   Pulse 92   Temp 97  F (36.1  C) (Oral)   Resp 14   Ht 1.579 m (5' 2.17\")   Wt 62.9 kg (138 lb 9.6 oz)   LMP 11/10/2016   SpO2 97%   BMI 25.21 kg/m    Body mass index is 25.21 kg/m .  Physical Exam   GENERAL: healthy, alert and no distress  EYES: Eyes grossly normal to inspection, PERRL and conjunctivae and sclerae normal  HENT: ear canals and TM's normal, nose and mouth without ulcers or lesions  NECK: no adenopathy, no asymmetry, masses, or scars and thyroid normal to palpation  RESP: lungs clear to auscultation - no rales, rhonchi or wheezes  CV: regular rate and rhythm, normal S1 S2, no S3 or S4, no " murmur, click or rub, no peripheral edema and peripheral pulses strong  ABDOMEN: soft, nontender, no hepatosplenomegaly, no masses and bowel sounds normal  SKIN: no suspicious lesions or rashes  PSYCH: mentation appears normal, affect normal/bright          Assessment & Plan       ICD-10-CM    1. Viral syndrome B34.9 Influenza A/B antigen   2. Viral gastroenteritis A08.4 ondansetron (ZOFRAN) 4 MG tablet     naproxen (NAPROSYN) 500 MG tablet   3. Flank pain R10.9 UA with Microscopic reflex to Culture     CANCELED: UA with Microscopic reflex to Culture     CANCELED: UA with Microscopic reflex to Culture   symptom control  Patient refused urine sample  Hydration  Supportive care       Follow up if symptoms worsen or fail to improve.   Ramone Epps MD  [facility]

## 2020-01-01 ASSESSMENT — ASTHMA QUESTIONNAIRES: ACT_TOTALSCORE: 20

## 2020-04-07 ENCOUNTER — VIRTUAL VISIT (OUTPATIENT)
Dept: FAMILY MEDICINE | Facility: CLINIC | Age: 45
End: 2020-04-07

## 2020-04-07 DIAGNOSIS — N89.8 VAGINAL DISCHARGE: Primary | ICD-10-CM

## 2020-04-07 PROCEDURE — 99213 OFFICE O/P EST LOW 20 MIN: CPT | Mod: TEL | Performed by: FAMILY MEDICINE

## 2020-04-07 RX ORDER — FLUCONAZOLE 150 MG/1
150 TABLET ORAL ONCE
Qty: 1 TABLET | Refills: 0 | Status: SHIPPED | OUTPATIENT
Start: 2020-04-07 | End: 2020-04-07

## 2020-04-07 RX ORDER — METRONIDAZOLE 500 MG/1
500 TABLET ORAL 2 TIMES DAILY
Qty: 14 TABLET | Refills: 0 | Status: SHIPPED | OUTPATIENT
Start: 2020-04-07 | End: 2020-04-14

## 2020-04-07 NOTE — PROGRESS NOTES
"Subjective     Di Higgins is a 44 year old female who is being evaluated via a billable telephone visit.      The patient has been notified of following:     \"This telephone visit will be conducted via a call between you and your physician/provider. We have found that certain health care needs can be provided without the need for a physical exam.  This service lets us provide the care you need with a short phone conversation.  If a prescription is necessary we can send it directly to your pharmacy.  If lab work is needed we can place an order for that and you can then stop by our lab to have the test done at a later time.    Telephone visits are billed at different rates depending on your insurance coverage. During this emergency period, for some insurers they may be billed the same as an in-person visit.  Please reach out to your insurance provider with any questions.    If during the course of the call the physician/provider feels a telephone visit is not appropriate, you will not be charged for this service.\"    Patient has given verbal consent for Telephone visit?  Yes    Di Higgins complains of   Chief Complaint   Patient presents with     Telephone      Patient reports that her asthma doing well. Works in a nursing  Concerned about vaginal discharge which been on for a few weeks, thought was yeast infection; used some OTC antifungal medication and appears to subside but seem to be worsening. The discharge is creamy, no itching or no bad smell.  Had hysterectomy a while ago.    ALLERGIES  Penicillin v    Assessment/Plan:  1. Vaginal discharge    Discussed likelihood of persisting yeast infection or bacterial vaginosis and the best way to tell will be by a wet prep. She does not want to come in at this time. Opted to do empiric oral treatment for yeast and BV and if symptoms persist will then follow up for wet prep.   - fluconazole (DIFLUCAN) 150 MG tablet; Take 1 tablet (150 mg) by mouth once for 1 " dose  Dispense: 1 tablet; Refill: 0  - metroNIDAZOLE (FLAGYL) 500 MG tablet; Take 1 tablet (500 mg) by mouth 2 times daily for 7 days  Dispense: 14 tablet; Refill: 0    No follow-ups on file.    1:22 PM  1:35 PM    Phone call duration: 13 minutes    Amilcar Acosta MD

## 2020-05-14 ENCOUNTER — MYC REFILL (OUTPATIENT)
Dept: FAMILY MEDICINE | Facility: CLINIC | Age: 45
End: 2020-05-14

## 2020-05-14 DIAGNOSIS — J45.41 MODERATE PERSISTENT ASTHMA WITH ACUTE EXACERBATION: ICD-10-CM

## 2020-05-14 RX ORDER — PREDNISONE 10 MG/1
TABLET ORAL
Start: 2020-05-14

## 2020-07-22 ENCOUNTER — MYC MEDICAL ADVICE (OUTPATIENT)
Dept: FAMILY MEDICINE | Facility: CLINIC | Age: 45
End: 2020-07-22

## 2020-10-21 ENCOUNTER — MYC REFILL (OUTPATIENT)
Dept: FAMILY MEDICINE | Facility: CLINIC | Age: 45
End: 2020-10-21

## 2020-10-21 DIAGNOSIS — J45.41 MODERATE PERSISTENT ASTHMA WITH ACUTE EXACERBATION: ICD-10-CM

## 2020-10-21 RX ORDER — ALBUTEROL SULFATE 90 UG/1
2 AEROSOL, METERED RESPIRATORY (INHALATION) EVERY 6 HOURS
Qty: 36 G | Refills: 0 | Status: SHIPPED | OUTPATIENT
Start: 2020-10-21 | End: 2020-12-10

## 2020-12-09 DIAGNOSIS — J45.41 MODERATE PERSISTENT ASTHMA WITH ACUTE EXACERBATION: ICD-10-CM

## 2020-12-10 RX ORDER — ALBUTEROL SULFATE 90 UG/1
AEROSOL, METERED RESPIRATORY (INHALATION)
Qty: 18 G | Refills: 0 | Status: SHIPPED | OUTPATIENT
Start: 2020-12-10 | End: 2021-01-20

## 2020-12-10 NOTE — TELEPHONE ENCOUNTER
There is not a current, normal ACT on file in the last 6 months.  RN unable to refill medication.    Noelle Cho BSN, RN

## 2021-01-10 ENCOUNTER — HEALTH MAINTENANCE LETTER (OUTPATIENT)
Age: 46
End: 2021-01-10

## 2021-01-20 DIAGNOSIS — J45.41 MODERATE PERSISTENT ASTHMA WITH ACUTE EXACERBATION: ICD-10-CM

## 2021-01-20 RX ORDER — ALBUTEROL SULFATE 90 UG/1
AEROSOL, METERED RESPIRATORY (INHALATION)
Qty: 18 G | Refills: 2 | Status: SHIPPED | OUTPATIENT
Start: 2021-01-20 | End: 2021-03-28

## 2021-03-08 ENCOUNTER — MYC REFILL (OUTPATIENT)
Dept: FAMILY MEDICINE | Facility: CLINIC | Age: 46
End: 2021-03-08

## 2021-03-08 DIAGNOSIS — J45.41 MODERATE PERSISTENT ASTHMA WITH ACUTE EXACERBATION: ICD-10-CM

## 2021-03-10 ENCOUNTER — MYC MEDICAL ADVICE (OUTPATIENT)
Dept: FAMILY MEDICINE | Facility: CLINIC | Age: 46
End: 2021-03-10

## 2021-03-10 RX ORDER — PREDNISONE 10 MG/1
TABLET ORAL
Qty: 20 TABLET | Refills: 0 | Status: SHIPPED | OUTPATIENT
Start: 2021-03-10 | End: 2021-05-25

## 2021-03-10 NOTE — TELEPHONE ENCOUNTER
See AppSame message.    Routing to provider to please advise.    Melissa Henderson, SPRINGN RN  [facility] [place]

## 2021-03-13 ENCOUNTER — HEALTH MAINTENANCE LETTER (OUTPATIENT)
Age: 46
End: 2021-03-13

## 2021-03-27 DIAGNOSIS — J45.41 MODERATE PERSISTENT ASTHMA WITH ACUTE EXACERBATION: ICD-10-CM

## 2021-03-28 RX ORDER — ALBUTEROL SULFATE 90 UG/1
2 AEROSOL, METERED RESPIRATORY (INHALATION) EVERY 6 HOURS PRN
Qty: 18 G | Refills: 0 | Status: SHIPPED | OUTPATIENT
Start: 2021-03-28 | End: 2021-04-15

## 2021-03-31 NOTE — PROGRESS NOTES
Assessment & Plan   Di is a 44 yo F with asthma, seasonal allergies, headaches, gerd, mite allergy presenting with generalized pruritis.    Generalized pruritus     Differentials include allergies, cholestatic jaundice, medications especially both of vaccination with COVID-19.  Discussed the symptom as likely to be benign, could be related with the vaccination but this is getting better we will watch it on Benadryl and Claritin.  If persisting will check bilirubin levels.    Urticaria  Symptomatic treatment with Benadryl and Claritin    Return in about 6 weeks (around 5/14/2021) for Physical with fasting labs.    Amilcar Acosta MD  [facility] LAZ Sevilla is a 45 year old who presents for the following health issues:      HPI     Chief Complaint   Patient presents with     Allergic Reaction     Received the Moderna Vaccine 1/27/21 and 2/24/21, ever since I been having red type itchy rash that shows up different spots of my body and the rash type  comes and goes.     Itching with wheals after   Rash 2 days in the arm followed by itchiness all over the body  Getting a little better.  Alleviating factors: Aveno lotion, hot meal baths, Benadryl.  Also in the middle of a divorce, not sure whether this is contributing.  Side effect of Moderna:  Post-authorization: Dermatologic: [facility]), [facility]    Asthma: doing well  Healthcare maintenance: due    Review of Systems   HEENT, cardiovascular, pulmonary, gi and gu systems are negative, except as otherwise noted.      Objective    /64   Pulse 99   Temp 98.9  F (37.2  C) (Oral)   Wt 68 kg (150 lb)   LMP 11/10/2016   SpO2 99%   BMI 27.29 kg/m    Body mass index is 27.29 kg/m .  Physical Exam   GENERAL: healthy, alert and no distress  EYES: Sclera looks clear  NECK: no adenopathy and thyroid normal to palpation  RESP: lungs clear to auscultation -  no rales, rhonchi or wheezes  CV: regular rate and rhythm, no murmur, click or rub.  SKIN: No obvious rash at this time

## 2021-04-02 ENCOUNTER — OFFICE VISIT (OUTPATIENT)
Dept: FAMILY MEDICINE | Facility: CLINIC | Age: 46
End: 2021-04-02
Payer: COMMERCIAL

## 2021-04-02 VITALS
HEART RATE: 99 BPM | OXYGEN SATURATION: 99 % | BODY MASS INDEX: 27.29 KG/M2 | TEMPERATURE: 98.9 F | WEIGHT: 150 LBS | DIASTOLIC BLOOD PRESSURE: 64 MMHG | SYSTOLIC BLOOD PRESSURE: 118 MMHG

## 2021-04-02 DIAGNOSIS — L50.9 URTICARIA: ICD-10-CM

## 2021-04-02 DIAGNOSIS — L29.9 GENERALIZED PRURITUS: Primary | ICD-10-CM

## 2021-04-02 PROCEDURE — 99213 OFFICE O/P EST LOW 20 MIN: CPT | Performed by: FAMILY MEDICINE

## 2021-04-13 DIAGNOSIS — J45.41 MODERATE PERSISTENT ASTHMA WITH ACUTE EXACERBATION: ICD-10-CM

## 2021-04-15 ENCOUNTER — MYC REFILL (OUTPATIENT)
Dept: FAMILY MEDICINE | Facility: CLINIC | Age: 46
End: 2021-04-15

## 2021-04-15 DIAGNOSIS — J45.41 MODERATE PERSISTENT ASTHMA WITH ACUTE EXACERBATION: ICD-10-CM

## 2021-04-15 RX ORDER — ALBUTEROL SULFATE 90 UG/1
AEROSOL, METERED RESPIRATORY (INHALATION)
Qty: 18 G | Refills: 0 | Status: CANCELLED | OUTPATIENT
Start: 2021-04-15

## 2021-04-15 RX ORDER — ALBUTEROL SULFATE 90 UG/1
AEROSOL, METERED RESPIRATORY (INHALATION)
Qty: 18 G | Refills: 0 | Status: SHIPPED | OUTPATIENT
Start: 2021-04-15 | End: 2021-07-23

## 2021-04-16 NOTE — TELEPHONE ENCOUNTER
Duplicate, 1st request.    VENTOLIN  (90 Base) MCG/ACT inhaler.  Tawana LEGGETT CMA ([facility])

## 2021-04-19 ENCOUNTER — MYC REFILL (OUTPATIENT)
Dept: FAMILY MEDICINE | Facility: CLINIC | Age: 46
End: 2021-04-19

## 2021-04-19 DIAGNOSIS — J45.41 MODERATE PERSISTENT ASTHMA WITH ACUTE EXACERBATION: ICD-10-CM

## 2021-04-19 RX ORDER — ALBUTEROL SULFATE 90 UG/1
AEROSOL, METERED RESPIRATORY (INHALATION)
Qty: 18 G | Refills: 0 | Status: CANCELLED | OUTPATIENT
Start: 2021-04-19

## 2021-05-25 ENCOUNTER — MYC REFILL (OUTPATIENT)
Dept: FAMILY MEDICINE | Facility: CLINIC | Age: 46
End: 2021-05-25

## 2021-05-25 DIAGNOSIS — J45.41 MODERATE PERSISTENT ASTHMA WITH ACUTE EXACERBATION: ICD-10-CM

## 2021-05-27 NOTE — TELEPHONE ENCOUNTER
Routing refill request to provider for review/approval because:  Drug not on the [facility] refill protocol           Pending Prescriptions:                       Disp   Refills    predniSONE (DELTASONE) 10 MG tablet        20 tab*0        Sig: Take 40 mg qd x 2 days, 30 mg qd x 2 days, 20 mg qd x           2 days, 10 mg qd x 2 days.        Jose Childs RN

## 2021-05-28 RX ORDER — PREDNISONE 10 MG/1
TABLET ORAL
Qty: 20 TABLET | Refills: 0 | Status: SHIPPED | OUTPATIENT
Start: 2021-05-28 | End: 2022-01-25

## 2021-05-28 NOTE — TELEPHONE ENCOUNTER
Detailed message left on patient's mobile voicemail advising appointment needed for refills.     Shannen Gonzales RN

## 2021-06-02 ENCOUNTER — OFFICE VISIT (OUTPATIENT)
Dept: FAMILY MEDICINE | Facility: CLINIC | Age: 46
End: 2021-06-02
Payer: COMMERCIAL

## 2021-06-02 VITALS
WEIGHT: 152 LBS | HEIGHT: 62 IN | DIASTOLIC BLOOD PRESSURE: 80 MMHG | TEMPERATURE: 98.3 F | HEART RATE: 86 BPM | SYSTOLIC BLOOD PRESSURE: 128 MMHG | OXYGEN SATURATION: 98 % | BODY MASS INDEX: 27.97 KG/M2

## 2021-06-02 DIAGNOSIS — J45.40 MODERATE PERSISTENT ASTHMA, UNSPECIFIED WHETHER COMPLICATED: ICD-10-CM

## 2021-06-02 DIAGNOSIS — F51.02 ADJUSTMENT INSOMNIA: ICD-10-CM

## 2021-06-02 DIAGNOSIS — F43.22 ADJUSTMENT DISORDER WITH ANXIOUS MOOD: ICD-10-CM

## 2021-06-02 DIAGNOSIS — Z00.00 ROUTINE HISTORY AND PHYSICAL EXAMINATION OF ADULT: Primary | ICD-10-CM

## 2021-06-02 LAB
ALBUMIN SERPL-MCNC: 3.5 G/DL (ref 3.4–5)
ALP SERPL-CCNC: 80 U/L (ref 40–150)
ALT SERPL W P-5'-P-CCNC: 30 U/L (ref 0–50)
ANION GAP SERPL CALCULATED.3IONS-SCNC: 3 MMOL/L (ref 3–14)
AST SERPL W P-5'-P-CCNC: 17 U/L (ref 0–45)
BILIRUB SERPL-MCNC: 0.5 MG/DL (ref 0.2–1.3)
BUN SERPL-MCNC: 12 MG/DL (ref 7–30)
CALCIUM SERPL-MCNC: 8.8 MG/DL (ref 8.5–10.1)
CHLORIDE SERPL-SCNC: 110 MMOL/L (ref 94–109)
CHOLEST SERPL-MCNC: 174 MG/DL
CO2 SERPL-SCNC: 27 MMOL/L (ref 20–32)
CREAT SERPL-MCNC: 1.09 MG/DL (ref 0.52–1.04)
GFR SERPL CREATININE-BSD FRML MDRD: 61 ML/MIN/{1.73_M2}
GLUCOSE SERPL-MCNC: 95 MG/DL (ref 70–99)
HDLC SERPL-MCNC: 45 MG/DL
LDLC SERPL CALC-MCNC: 111 MG/DL
NONHDLC SERPL-MCNC: 129 MG/DL
POTASSIUM SERPL-SCNC: 4 MMOL/L (ref 3.4–5.3)
PROT SERPL-MCNC: 7.2 G/DL (ref 6.8–8.8)
SODIUM SERPL-SCNC: 140 MMOL/L (ref 133–144)
TRIGL SERPL-MCNC: 89 MG/DL

## 2021-06-02 PROCEDURE — 80061 LIPID PANEL: CPT | Performed by: FAMILY MEDICINE

## 2021-06-02 PROCEDURE — 96127 BRIEF EMOTIONAL/BEHAV ASSMT: CPT | Performed by: FAMILY MEDICINE

## 2021-06-02 PROCEDURE — 80053 COMPREHEN METABOLIC PANEL: CPT | Performed by: FAMILY MEDICINE

## 2021-06-02 PROCEDURE — 36415 COLL VENOUS BLD VENIPUNCTURE: CPT | Performed by: FAMILY MEDICINE

## 2021-06-02 PROCEDURE — 99213 OFFICE O/P EST LOW 20 MIN: CPT | Mod: 25 | Performed by: FAMILY MEDICINE

## 2021-06-02 PROCEDURE — 99396 PREV VISIT EST AGE 40-64: CPT | Performed by: FAMILY MEDICINE

## 2021-06-02 ASSESSMENT — ENCOUNTER SYMPTOMS
DIZZINESS: 0
COUGH: 0
NERVOUS/ANXIOUS: 1
CONSTIPATION: 0
HEMATOCHEZIA: 0
ABDOMINAL PAIN: 0
FREQUENCY: 0
FEVER: 0
HEMATURIA: 0
DIARRHEA: 0
CHILLS: 0
EYE PAIN: 0

## 2021-06-02 ASSESSMENT — PATIENT HEALTH QUESTIONNAIRE - PHQ9
SUM OF ALL RESPONSES TO PHQ QUESTIONS 1-9: 10
10. IF YOU CHECKED OFF ANY PROBLEMS, HOW DIFFICULT HAVE THESE PROBLEMS MADE IT FOR YOU TO DO YOUR WORK, TAKE CARE OF THINGS AT HOME, OR GET ALONG WITH OTHER PEOPLE: SOMEWHAT DIFFICULT
SUM OF ALL RESPONSES TO PHQ QUESTIONS 1-9: 10

## 2021-06-02 ASSESSMENT — MIFFLIN-ST. JEOR: SCORE: 1282.72

## 2021-06-02 NOTE — PROGRESS NOTES
SUBJECTIVE:   CC: Di Higgins is an 45 year old woman who presents for preventive health visit.     Patient has been advised of split billing requirements and indicates understanding: Yes  Healthy Habits:     Getting at least 3 servings of Calcium per day:  NO    Bi-annual eye exam:  Yes    Dental care twice a year:  Yes    Sleep apnea or symptoms of sleep apnea:  None    Diet:  Regular (no restrictions)    Frequency of exercise:  2-3 days/week    Duration of exercise:  Greater than 60 minutes    Taking medications regularly:  Yes    Medication side effects:  Other    PHQ-2 Total Score: 5    Additional concerns today:  Yes    Asthma:   Flare ups due to: Allergies, stress, insomnia   Not taking Dulera as not covered   Act score 16  ACT Total Scores 11/27/2019 12/31/2019 6/2/2021   ACT TOTAL SCORE - - -   ASTHMA ER VISITS - - -   ASTHMA HOSPITALIZATIONS - - -   ACT TOTAL SCORE (Goal Greater than or Equal to 20) 17 20 16   In the past 12 months, how many times did you visit the emergency room for your asthma without being admitted to the hospital? 0 0 0   In the past 12 months, how many times were you hospitalized overnight because of your asthma? 0 0 0       Today's PHQ-2 Score:   PHQ-2 ( 1999 Pfizer) 6/2/2021   Q1: Little interest or pleasure in doing things 3   Q2: Feeling down, depressed or hopeless 2   PHQ-2 Score 5   Q1: Little interest or pleasure in doing things Nearly every day   Q2: Feeling down, depressed or hopeless More than half the days   PHQ-2 Score 5     Abuse: Current or Past (Physical, Sexual or Emotional) - No  Do you feel safe in your environment? Yes    Have you ever done Advance Care Planning? (For example, a Health Directive, POLST, or a discussion with a medical provider or your loved ones about your wishes): No, advance care planning information given to patient to review.  Patient plans to discuss their wishes with loved ones or provider.      Social History     Tobacco Use      Smoking status: Former Smoker     Smokeless tobacco: Former User     Quit date: 1/2/1985   Substance Use Topics     Alcohol use: Yes     Comment: occasional     If you drink alcohol do you typically have >3 drinks per day or >7 drinks per week? No    Alcohol Use 6/2/2021   Prescreen: >3 drinks/day or >7 drinks/week? No   Prescreen: >3 drinks/day or >7 drinks/week? -   No flowsheet data found.    Reviewed orders with patient.  Reviewed health maintenance and updated orders accordingly - Yes  Patient Active Problem List   Diagnosis     GERD (gastroesophageal reflux disease)     Seasonal allergic rhinitis     Allergic rhinitis due to animal dander     House dust mite allergy     Rhinitis, allergic to other allergen     Vitamin D deficiency     Moderate persistent asthma     CARDIOVASCULAR SCREENING; LDL GOAL LESS THAN 160     Diagnostic skin and sensitization tests     Nonintractable paroxysmal hemicrania     Past Surgical History:   Procedure Laterality Date     HYSTERECTOMY, PAP NO LONGER INDICATED  12/09/2016     HYSTERECTOMY, VAGINAL  12/09/2016    uterine leiomyoma, adenomyosis     TUBAL LIGATION  1999    laparoscopic tubal       Social History     Tobacco Use     Smoking status: Former Smoker     Smokeless tobacco: Former User     Quit date: 1/2/1985   Substance Use Topics     Alcohol use: Yes     Comment: occasional     Family History   Problem Relation Age of Onset     Asthma Father      Neurologic Disorder Sister         MS           Breast Cancer Screening:    Breast CA Risk Assessment (FHS-7) 6/2/2021   Do you have a family history of breast, colon, or ovarian cancer? No / Unknown     click delete button to remove this line now  Mammogram Screening - Mammography discussed and declined  Pertinent mammograms are reviewed under the imaging tab.    History of abnormal Pap smear: Status post benign hysterectomy. Health Maintenance and Surgical History updated.  PAP / HPV Latest Ref Rng & Units 11/9/2015   PAP -  "NIL   HPV 16 DNA NEG Negative   HPV 18 DNA NEG Negative   OTHER HR HPV NEG Negative     Reviewed and updated as needed this visit by clinical staff  Tobacco  Allergies  Meds              Reviewed and updated as needed this visit by Provider                Review of Systems   Constitutional: Negative for chills and fever.   HENT: Negative for congestion and ear pain.    Eyes: Negative for pain.   Respiratory: Negative for cough.    Cardiovascular: Negative for chest pain.   Gastrointestinal: Negative for abdominal pain, constipation, diarrhea and hematochezia.   Genitourinary: Negative for frequency, genital sores and hematuria.   Neurological: Negative for dizziness.   Psychiatric/Behavioral: The patient is nervous/anxious.      OBJECTIVE:   /80   Pulse 86   Temp 98.3  F (36.8  C) (Oral)   Ht 1.575 m (5' 2\")   Wt 68.9 kg (152 lb)   LMP 11/10/2016   SpO2 98%   BMI 27.80 kg/m    Physical Exam  GENERAL: healthy, alert and no distress  EYES: Eyes grossly normal to inspection, PERRL and conjunctivae and sclerae normal  HENT: ear canals and TM's normal, nose and mouth without ulcers or lesions  NECK: no adenopathy and thyroid normal to palpation  RESP: lungs clear to auscultation - no rales, rhonchi or wheezes  BREAST: Deferred  CV: regular rate and rhythm, no murmur, click or rub, no peripheral edema   ABDOMEN: soft, nontender, no masses and bowel sounds normal  MS: no gross musculoskeletal defects noted, no edema  SKIN: no suspicious lesions or rashes  NEURO: Normal strength and tone, mentation intact and speech normal  PSYCH: mentation appears normal, affect normal/bright    Diagnostic Test Results:  Labs reviewed in Epic    ASSESSMENT/PLAN:   Di was seen today for physical.    Diagnoses and all orders for this visit:    Routine history and physical examination of adult  -     Lipid Profile (Chol, Trig, HDL, LDL calc)  -     Comprehensive metabolic panel (BMP + Alb, Alk Phos, ALT, AST, Total. " "MAICOL Dove)    Adjustment disorder with anxious mood  -     MENTAL HEALTH REFERRAL  - Adult; Outpatient Treatment; Individual/Couples/Family/Group Therapy/Health Psychology; [facility] [phone]; We will contact you to schedule the appointment or please call with any questions    Adjustment insomnia  -     MENTAL HEALTH REFERRAL  - Adult; Outpatient Treatment; Individual/Couples/Family/Group Therapy/Health Psychology; [facility] [phone]; We will contact you to schedule the appointment or please call with any questions    Moderate persistent asthma, unspecified whether complicated    ACT score 16, discussed doing a controller regularly and will try Breo Ellipta  -     fluticasone-vilanterol (BREO ELLIPTA) 100-25 MCG/INH inhaler; Inhale 1 puff into the lungs daily      Patient has been advised of split billing requirements and indicates understanding: Yes  COUNSELING:  Reviewed preventive health counseling, as reflected in patient instructions       Regular exercise       Healthy diet/nutrition    Estimated body mass index is 27.8 kg/m  as calculated from the following:    Height as of this encounter: 1.575 m (5' 2\").    Weight as of this encounter: 68.9 kg (152 lb).    Weight management plan: Discussed healthy diet and exercise guidelines    She reports that she has quit smoking. She quit smokeless tobacco use about 36 years ago.      Counseling Resources:  ATP IV Guidelines  Pooled Cohorts Equation Calculator  Breast Cancer Risk Calculator  BRCA-Related Cancer Risk Assessment: FHS-7 Tool  FRAX Risk Assessment  ICSI Preventive Guidelines  Dietary Guidelines for Americans, 2010  USDA's MyPlate  ASA Prophylaxis  Lung CA Screening    Amilcar Acosta MD  [facility]  Answers for HPI/ROS submitted by the patient on 6/2/2021   Annual Exam:  If you checked off any problems, how difficult have these problems made it for you to do your work, take " care of things at home, or get along with other people?: Somewhat difficult  PHQ9 TOTAL SCORE: 10

## 2021-06-03 ASSESSMENT — ASTHMA QUESTIONNAIRES: ACT_TOTALSCORE: 16

## 2021-06-03 ASSESSMENT — PATIENT HEALTH QUESTIONNAIRE - PHQ9: SUM OF ALL RESPONSES TO PHQ QUESTIONS 1-9: 10

## 2021-08-02 DIAGNOSIS — J45.40 MODERATE PERSISTENT ASTHMA, UNSPECIFIED WHETHER COMPLICATED: ICD-10-CM

## 2021-08-25 ENCOUNTER — MYC MEDICAL ADVICE (OUTPATIENT)
Dept: FAMILY MEDICINE | Facility: CLINIC | Age: 46
End: 2021-08-25

## 2021-08-25 NOTE — LETTER
August 27, 2021      Di Higgins  [address]  [place] [facility] [address]        To Whom It May Concern:    Di Higgins been feeling unwell since 8/25/2021 now improving; may return to work on 8/27/2021 without restrictions.      Sincerely,        Amilcar Acosta MD

## 2021-08-25 NOTE — LETTER
August 27, 2021      Di Higgins  [address]  [place] [facility] [address]        To Whom It May Concern:    Di Higgins been feeling unwell since 8/24/2021 now improving; may return to work on 8/27/2021 without restrictions.          Sincerely,        Amilcar Acosta MD

## 2021-08-25 NOTE — LETTER
August 27, 2021      Di Higgins  [address]  [place] [facility] [address]        To Whom It May Concern:    Di Higgins been feeling unwell since 8/24/2021 now improving; may return to work on 8/27/2021 without restrictions.          Sincerely,        Amilcar Acosta MD

## 2021-08-27 NOTE — TELEPHONE ENCOUNTER
Patient called would like her letter extended to Monday would like it faxed to [phone] Attn: HR also e-mailed to her at [email or web address]  Amira Owen,

## 2021-10-23 ENCOUNTER — HEALTH MAINTENANCE LETTER (OUTPATIENT)
Age: 46
End: 2021-10-23

## 2022-01-05 DIAGNOSIS — J45.41 MODERATE PERSISTENT ASTHMA WITH EXACERBATION: ICD-10-CM

## 2022-01-05 DIAGNOSIS — J45.40 MODERATE PERSISTENT ASTHMA, UNSPECIFIED WHETHER COMPLICATED: ICD-10-CM

## 2022-01-08 RX ORDER — ALBUTEROL SULFATE 0.83 MG/ML
SOLUTION RESPIRATORY (INHALATION)
Qty: 75 ML | Refills: 0 | Status: SHIPPED | OUTPATIENT
Start: 2022-01-08 | End: 2022-02-04

## 2022-01-25 ENCOUNTER — OFFICE VISIT (OUTPATIENT)
Dept: FAMILY MEDICINE | Facility: CLINIC | Age: 47
End: 2022-01-25
Payer: COMMERCIAL

## 2022-01-25 VITALS
OXYGEN SATURATION: 100 % | HEIGHT: 62 IN | WEIGHT: 144.4 LBS | HEART RATE: 75 BPM | BODY MASS INDEX: 26.57 KG/M2 | SYSTOLIC BLOOD PRESSURE: 144 MMHG | DIASTOLIC BLOOD PRESSURE: 88 MMHG | RESPIRATION RATE: 18 BRPM | TEMPERATURE: 98.4 F

## 2022-01-25 DIAGNOSIS — R03.0 ELEVATED BP WITHOUT DIAGNOSIS OF HYPERTENSION: ICD-10-CM

## 2022-01-25 DIAGNOSIS — Z12.11 COLON CANCER SCREENING: ICD-10-CM

## 2022-01-25 DIAGNOSIS — J45.40 MODERATE PERSISTENT ASTHMA WITHOUT COMPLICATION: Primary | ICD-10-CM

## 2022-01-25 DIAGNOSIS — J45.40 MODERATE PERSISTENT ASTHMA, UNSPECIFIED WHETHER COMPLICATED: ICD-10-CM

## 2022-01-25 PROCEDURE — 99213 OFFICE O/P EST LOW 20 MIN: CPT | Performed by: FAMILY MEDICINE

## 2022-01-25 ASSESSMENT — MIFFLIN-ST. JEOR: SCORE: 1243.24

## 2022-01-25 ASSESSMENT — ASTHMA QUESTIONNAIRES: ACT_TOTALSCORE: 21

## 2022-01-25 NOTE — PROGRESS NOTES
"Assessment & Plan     Moderate persistent asthma without complication  Moderate persistent asthma, unspecified whether complicated   Well controlled since initiating Breo Ellipta  - fluticasone-vilanterol (BREO ELLIPTA) 100-25 MCG/INH inhaler  Dispense: 60 each; Refill: 5    Elevated BP without diagnosis of hypertension    -  Will recheck at work and send readings.    Colon cancer screening  - Adult Gastro Ref - Procedure Only  956}     BMI:   Estimated body mass index is 26.41 kg/m  as calculated from the following:    Height as of this encounter: 1.575 m (5' 2\").    Weight as of this encounter: 65.5 kg (144 lb 6.4 oz).   Weight management plan: Discussed healthy diet and exercise guidelines    Return in about 5 months (around 6/25/2022) for Physical Exam.    Amilcar Acosta MD  [facility] is a 46 year old female who presents to clinic today for the following health issues accompanied by her none :    HPI     BP Readings from Last 6 Encounters:   01/25/22 (!) 144/88   06/02/21 128/80   04/02/21 118/64   12/31/19 122/78   08/19/19 132/84   08/14/19 (!) 167/88     Asthma Follow-Up    Was ACT completed today?    Yes    ACT Total Scores 1/25/2022   ACT TOTAL SCORE -   ASTHMA ER VISITS -   ASTHMA HOSPITALIZATIONS -   ACT TOTAL SCORE (Goal Greater than or Equal to 20) 21   In the past 12 months, how many times did you visit the emergency room for your asthma without being admitted to the hospital? 0   In the past 12 months, how many times were you hospitalized overnight because of your asthma? 0     Covid shots: 1/27/21, 2/24/21, 12/9/21    Flu shot: at work    How many days per week do you miss taking your asthma controller medication?  I do not have an asthma controller medication    Have you had any Emergency Room Visits, Urgent Care Visits, or Hospital Admissions since your last office visit?  No    Review of Systems   Constitutional, HEENT, " "cardiovascular, pulmonary, gi and gu systems are negative, except as otherwise noted.      Objective    BP (!) 144/88   Pulse 75   Temp 98.4  F (36.9  C) (Oral)   Resp 18   Ht 1.575 m (5' 2\")   Wt 65.5 kg (144 lb 6.4 oz)   LMP 11/10/2016 (Exact Date)   SpO2 100%   Breastfeeding No   BMI 26.41 kg/m    Body mass index is 26.41 kg/m .  Physical Exam   GENERAL: healthy, alert and no distress  RESP: lungs clear to auscultation - no rales, rhonchi or wheezes  CV: regular rate and rhythm,  no murmur, click or rub, no peripheral edema  MS: no gross musculoskeletal defects noted, no edema  NEURO: Normal strength and tone, mentation intact and speech normal  PSYCH: mentation appears normal, affect normal/bright        "

## 2022-02-01 DIAGNOSIS — J45.41 MODERATE PERSISTENT ASTHMA WITH ACUTE EXACERBATION: ICD-10-CM

## 2022-02-02 DIAGNOSIS — J45.41 MODERATE PERSISTENT ASTHMA WITH EXACERBATION: ICD-10-CM

## 2022-02-03 RX ORDER — ALBUTEROL SULFATE 90 UG/1
AEROSOL, METERED RESPIRATORY (INHALATION)
Qty: 18 G | Refills: 2 | Status: SHIPPED | OUTPATIENT
Start: 2022-02-03 | End: 2022-04-21

## 2022-02-03 NOTE — TELEPHONE ENCOUNTER
"Prescription approved per [facility] Refill Protocol.  Requested Prescriptions   Pending Prescriptions Disp Refills     VENTOLIN  (90 Base) MCG/ACT inhaler [Pharmacy Med Name: VENTOLIN HFA INH W/DOS CTR 200PUFFS] 18 g 2     Sig: INHALE 2 PUFFS INTO THE LUNGS EVERY 6 HOURS       Asthma Maintenance Inhalers - Anticholinergics Passed - 2/1/2022  8:51 AM        Passed - Patient is age 12 years or older        Passed - Asthma control assessment score within normal limits in last 6 months     Please review ACT score.           Passed - Medication is active on med list        Passed - Recent (6 mo) or future (30 days) visit within the authorizing provider's specialty     Patient had office visit in the last 6 months or has a visit in the next 30 days with authorizing provider or within the authorizing provider's specialty.  See \"Patient Info\" tab in inbasket, or \"Choose Columns\" in Meds & Orders section of the refill encounter.           Short-Acting Beta Agonist Inhalers Protocol  Passed - 2/1/2022  8:51 AM        Passed - Patient is age 12 or older        Passed - Asthma control assessment score within normal limits in last 6 months     Please review ACT score.           Passed - Medication is active on med list        Passed - Recent (6 mo) or future (30 days) visit within the authorizing provider's specialty     Patient had office visit in the last 6 months or has a visit in the next 30 days with authorizing provider or within the authorizing provider's specialty.  See \"Patient Info\" tab in inbasket, or \"Choose Columns\" in Meds & Orders section of the refill encounter.                 "

## 2022-02-04 RX ORDER — ALBUTEROL SULFATE 0.83 MG/ML
SOLUTION RESPIRATORY (INHALATION)
Qty: 75 ML | Refills: 1 | Status: SHIPPED | OUTPATIENT
Start: 2022-02-04

## 2022-02-04 NOTE — TELEPHONE ENCOUNTER
"Prescription approved per [facility] Refill Protocol.    Requested Prescriptions   Pending Prescriptions Disp Refills     albuterol (PROVENTIL) (2.5 MG/3ML) 0.083% neb solution [Pharmacy Med Name: ALBUTEROL 0.083%(2.5MG/3ML) 25X3ML] 75 mL 0     Sig: USE 1 VIAL VIA NEBULIZER EVERY 6 HOURS AS NEEDED FOR SHORTNESS OF BREATH OR DIFFICULT BREATHING OR WHEEZING       Asthma Maintenance Inhalers - Anticholinergics Passed - 2/2/2022  4:03 AM        Passed - Patient is age 12 years or older        Passed - Asthma control assessment score within normal limits in last 6 months     Please review ACT score.           Passed - Medication is active on med list        Passed - Recent (6 mo) or future (30 days) visit within the authorizing provider's specialty     Patient had office visit in the last 6 months or has a visit in the next 30 days with authorizing provider or within the authorizing provider's specialty.  See \"Patient Info\" tab in inbasket, or \"Choose Columns\" in Meds & Orders section of the refill encounter.           Short-Acting Beta Agonist Inhalers Protocol  Passed - 2/2/2022  4:03 AM        Passed - Patient is age 12 or older        Passed - Asthma control assessment score within normal limits in last 6 months     Please review ACT score.           Passed - Medication is active on med list        Passed - Recent (6 mo) or future (30 days) visit within the authorizing provider's specialty     Patient had office visit in the last 6 months or has a visit in the next 30 days with authorizing provider or within the authorizing provider's specialty.  See \"Patient Info\" tab in inbasket, or \"Choose Columns\" in Meds & Orders section of the refill encounter.               Margot ESPINOZA RN, BSN  [facility]        "

## 2022-02-15 ENCOUNTER — OFFICE VISIT (OUTPATIENT)
Dept: URGENT CARE | Facility: URGENT CARE | Age: 47
End: 2022-02-15
Payer: COMMERCIAL

## 2022-02-15 VITALS
DIASTOLIC BLOOD PRESSURE: 105 MMHG | RESPIRATION RATE: 16 BRPM | SYSTOLIC BLOOD PRESSURE: 169 MMHG | HEART RATE: 70 BPM | OXYGEN SATURATION: 99 % | TEMPERATURE: 98.4 F

## 2022-02-15 DIAGNOSIS — H53.8 BLURRED VISION: ICD-10-CM

## 2022-02-15 DIAGNOSIS — R51.9 ACUTE NONINTRACTABLE HEADACHE, UNSPECIFIED HEADACHE TYPE: Primary | ICD-10-CM

## 2022-02-15 DIAGNOSIS — R61 NIGHT SWEATS: ICD-10-CM

## 2022-02-15 DIAGNOSIS — I16.0 HYPERTENSIVE URGENCY: ICD-10-CM

## 2022-02-15 PROCEDURE — 99214 OFFICE O/P EST MOD 30 MIN: CPT | Performed by: PHYSICIAN ASSISTANT

## 2022-02-15 PROCEDURE — 93000 ELECTROCARDIOGRAM COMPLETE: CPT | Performed by: PHYSICIAN ASSISTANT

## 2022-02-15 ASSESSMENT — PAIN SCALES - GENERAL: PAINLEVEL: SEVERE PAIN (7)

## 2022-02-15 NOTE — PROGRESS NOTES
Chief Complaint   Patient presents with     Hypertension     Patient was seen 1/25 and told to monitor blood pressure- blood pressure started getting higher (readings 187/105, 194/106) and patient is now getting headaches and feeling off., getting sweating at night and had blurry vision yesterday.      EKG sinus rhythm, within normal limits, rate 64          ASSESSMENT:     ICD-10-CM    1. Acute nonintractable headache, unspecified headache type  R51.9    2. Hypertensive urgency  I16.0 EKG 12-lead complete w/read - Clinics   3. Night sweats  R61    4. Blurred vision  H53.8          PLAN: Elevated blood pressure.  46-year-old female presents for evaluation of headaches now present since about mid January but now worse.  She rates them 6.5-7 out of 10.  They do subside somewhat with ibuprofen.  She does not really have a history of headaches.  She notes that her blood pressure has been really high the past week.  Last night she had some blurry vision.  And also it is concerning because she has been having some night sweats and also some sweating during the day.  Blood pressure here is 169/105.  EKG sinus rhythm within normal limits.  Rate of 64.  Family history of lupus, diabetes, hypertension, MS.  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  Limited on labs and imaging.  Feel she needs further work-up than we can do here with her constellation of symptoms- headaches, elevated blood pressure, sweating, blurry vision.  Currently denies chest pain or shortness of breath.  with patient.  All questions are answered, patient indicates understanding of these issues and is in agreement with plan.   Patient care instructions are discussed/given at the end of visit.     Starr Ge PA-C      SUBJECTIVE:  45 yo female presents for frontal headache since mid January but worse over the past week.  Increased blood pressure over the past week.  Yesterday had some blurry vision.  Also  unusual in that she has had night sweats and occasional sweats during the day.  Wears glasses for driving and reading.  Ibuprofen is not helping with the headache.  Rates the headache 7 out of 10.  This headache is unusual for her.  Describes it as a pressure sensation.  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  No chest pain or shortness of breath.  Family history of lupus, diabetes, hypertension, and this.  No nausea or vomiting.  No facial numbness or tingling.  No extremity paresthesias or weakness.  She denies any palpitations.      Allergies   Allergen Reactions     Penicillin V        Past Medical History:   Diagnosis Date     Allergic rhinitis due to animal dander      Diagnostic skin and sensitization tests 4/11 IgE tests pos. for:  pos. for: cat/dog(+)/DM/M/T/RW    tests per [facility]     GERD (gastroesophageal reflux disease)     4/11 visit, GERD with epigastric pain--Neg. H. Pylori     House dust mite allergy      Moderate persistent asthma 4/11 [id number]    at new pt visit of 4/11, pt admited no controller meds x 1 yr.     Rhinitis, allergic to other allergen      Seasonal allergic rhinitis     4/11 IgE tests pos. for: cat/dog(+)/DM/M/T/RW     Vitamin D deficiency 4/11 noted--started on supplement       albuterol (PROVENTIL) (2.5 MG/3ML) 0.083% neb solution, USE 1 VIAL VIA NEBULIZER EVERY 6 HOURS AS NEEDED FOR SHORTNESS OF BREATH OR DIFFICULT BREATHING OR WHEEZING  fluticasone-vilanterol (BREO ELLIPTA) 100-25 MCG/INH inhaler, Inhale 1 puff into the lungs daily  VENTOLIN  (90 Base) MCG/ACT inhaler, INHALE 2 PUFFS INTO THE LUNGS EVERY 6 HOURS  order for DME, Equipment being ordered: Nebulizer    No current facility-administered medications on file prior to visit.      Social History     Tobacco Use     Smoking status: Former Smoker     Smokeless tobacco: Former User     Quit date: 1/2/1985   Substance Use Topics     Alcohol use: Yes     Comment: occasional        ROS:  CONSTITUTIONAL: Negative for fatigue or fever.  EYES: Negative for eye problems.  ENT: Neg for ST.  RESP: Neg for cough.  CV: Negative for chest pains.  GI: Negative for vomiting.  MUSCULOSKELETAL:  Negative for significant muscle or joint pains.  NEUROLOGIC: As above   SKIN: Negative for rash.  PSYCH: Normal mentation for age.    OBJECTIVE:  BP (!) 169/105   Pulse 70   Temp 98.4  F (36.9  C) (Tympanic)   Resp 16   LMP 11/10/2016 (Exact Date)   SpO2 99%   GENERAL APPEARANCE: Healthy, alert and no distress.  EYES:Conjunctiva/sclera clear.  Pupils equal reactive to light and accommodation.  EOMs intact.  Funduscopic exam grossly benign.  EARS: No cerumen.   Ear canals w/o erythema.  TM's intact w/o erythema.    NOSE/MOUTH: Nose without ulcers, erythema or lesions.  SINUSES: No maxillary sinus tenderness.  THROAT: No erythema w/o tonsillar enlargement . No exudates.  NECK: Supple, nontender, no lymphadenopathy.  RESP: Lungs clear to auscultation - no rales, rhonchi or wheezes  CV: Regular rate and rhythm, normal S1 S2, no murmur noted.  NEURO: Awake, alert    SKIN: No rashes  Abdomen-soft, nontender.  Cranial nerves II through XII grossly intact  No tongue deviation.  Smile symmetric.      Starr Ge PA-C

## 2022-02-15 NOTE — PATIENT INSTRUCTIONS
46-year-old female presents for evaluation of headaches now present since about mid January but now worse.  She rates them 6.5-7 out of 10.  They do subside somewhat with ibuprofen.  She does not really have a history of headaches.  She notes that her blood pressure has been really high the past week.  Last night she had some blurry vision.  And also it is concerning because she has been having some night sweats and also some sweating during the day.  Blood pressure here is 169/105.  EKG sinus rhythm within normal limits.  Rate of 64.  Family history of lupus, diabetes, hypertension, MS.  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  Limited on labs and imaging.  Feel she needs further work-up than we can do here with her constellation of symptoms- headaches, elevated blood pressure, sweating, blurry vision.  Currently denies chest pain or shortness of breath.

## 2022-02-18 ENCOUNTER — OFFICE VISIT (OUTPATIENT)
Dept: FAMILY MEDICINE | Facility: CLINIC | Age: 47
End: 2022-02-18
Payer: COMMERCIAL

## 2022-02-18 VITALS
DIASTOLIC BLOOD PRESSURE: 82 MMHG | BODY MASS INDEX: 24.84 KG/M2 | HEIGHT: 62 IN | HEART RATE: 85 BPM | OXYGEN SATURATION: 99 % | SYSTOLIC BLOOD PRESSURE: 119 MMHG | WEIGHT: 135 LBS | RESPIRATION RATE: 17 BRPM

## 2022-02-18 DIAGNOSIS — G93.0 BRAIN CYST: Primary | ICD-10-CM

## 2022-02-18 DIAGNOSIS — R63.4 WEIGHT LOSS: ICD-10-CM

## 2022-02-18 DIAGNOSIS — I10 HYPERTENSION GOAL BP (BLOOD PRESSURE) < 140/90: ICD-10-CM

## 2022-02-18 DIAGNOSIS — Z11.59 NEED FOR HEPATITIS C SCREENING TEST: ICD-10-CM

## 2022-02-18 DIAGNOSIS — Z12.4 CERVICAL CANCER SCREENING: ICD-10-CM

## 2022-02-18 LAB
ALBUMIN SERPL-MCNC: 4.3 G/DL (ref 3.4–5)
ALP SERPL-CCNC: 84 U/L (ref 40–150)
ALT SERPL W P-5'-P-CCNC: 37 U/L (ref 0–50)
AST SERPL W P-5'-P-CCNC: 13 U/L (ref 0–45)
BILIRUB DIRECT SERPL-MCNC: 0.2 MG/DL (ref 0–0.2)
BILIRUB SERPL-MCNC: 1.2 MG/DL (ref 0.2–1.3)
CRP SERPL-MCNC: <2.9 MG/L (ref 0–8)
LIPASE SERPL-CCNC: 57 U/L (ref 73–393)
PROT SERPL-MCNC: 8.5 G/DL (ref 6.8–8.8)

## 2022-02-18 PROCEDURE — 86140 C-REACTIVE PROTEIN: CPT | Performed by: FAMILY MEDICINE

## 2022-02-18 PROCEDURE — 99214 OFFICE O/P EST MOD 30 MIN: CPT | Performed by: FAMILY MEDICINE

## 2022-02-18 PROCEDURE — 83690 ASSAY OF LIPASE: CPT | Performed by: FAMILY MEDICINE

## 2022-02-18 PROCEDURE — 80076 HEPATIC FUNCTION PANEL: CPT | Performed by: FAMILY MEDICINE

## 2022-02-18 PROCEDURE — 36415 COLL VENOUS BLD VENIPUNCTURE: CPT | Performed by: FAMILY MEDICINE

## 2022-02-18 ASSESSMENT — PATIENT HEALTH QUESTIONNAIRE - PHQ9: SUM OF ALL RESPONSES TO PHQ QUESTIONS 1-9: 9

## 2022-02-18 ASSESSMENT — PAIN SCALES - GENERAL: PAINLEVEL: NO PAIN (0)

## 2022-02-18 NOTE — PROGRESS NOTES
Assessment & Plan     Brain cyst  Incidental finding on CT MRI, after being seen for headaches and with high blood pressure.  Urology evaluation was recommended.  She was to follow-up with neurology to see the rest of her family.  Referral given  - Neurology  Referral (Migraine Care Package); Future    Hypertension goal BP (blood pressure) < 140/90  Blood pressure at goal at this time on chlorthalidone.  We will maintain the same    Need for hepatitis C screening test  Cervical cancer screening  We will follow-up for physical    Weight loss  Has been having poor appetite; but will screen also for other causes including malignancy; due for colonoscopy  - Lipase; Future  - Hepatic panel (Albumin, ALT, AST, Bili, Alk Phos, TP); Future  - CRP, inflammation; Future  - CRP, inflammation  - Hepatic panel (Albumin, ALT, AST, Bili, Alk Phos, TP)  - Lipase    Return in about 2 months (around 4/18/2022) for Routine Visit.    Amilcar Acosta MD  [facility] LAZ Sevilla is a 46 year old who presents for the following health issues     History of Present Illness       Hypertension: She presents for follow up of hypertension.  She does check blood pressure  regularly outside of the clinic. Outside blood pressures have been over 140/90. She does not follow a low salt diet.     She eats 0-1 servings of fruits and vegetables daily.She consumes 1 sweetened beverage(s) daily.She exercises with enough effort to increase her heart rate 9 or less minutes per day.  She exercises with enough effort to increase her heart rate 3 or less days per week.   She is taking medications regularly.     1. Headaches: improving not needing pain medication as much. Vision better. Not headaches or migraines in past; but started having some headache from 1/2022.  2. Loss of Appetite: not hungry, no nausea or vomiting  3. Insomnia: interrupted sleep, been sweating at night  4. HTN  5. Weight loss     Wt  "Readings from Last 10 Encounters:   02/18/22 61.2 kg (135 lb)   01/25/22 65.5 kg (144 lb 6.4 oz)   06/02/21 68.9 kg (152 lb)   04/02/21 68 kg (150 lb)   12/31/19 62.9 kg (138 lb 9.6 oz)   08/19/19 64.6 kg (142 lb 8 oz)   05/14/19 64.4 kg (142 lb)   07/25/18 61.2 kg (135 lb)   07/28/17 62.1 kg (137 lb)   01/23/17 64.9 kg (143 lb)       MRI BRAIN W&W/O CON 2/15/22:  Final Result   IMPRESSION:   Two small subcentimeter nonaggressive appearing periventricular/intraventricular cysts abutting the basal ganglia and anterior horn of the right lateral ventricle. These follow CSF on all sequences, do not demonstrate significant surrounding gliosis, and are nonenhancing. Primary considerations in the differential is an ependymal cyst, neural glial cyst, or intraventricular arachnoid cyst.      Review of Systems   Constitutional, HEENT, cardiovascular, pulmonary, gi and gu systems are negative, except as otherwise noted.      Objective    /82 (BP Location: Left arm, Cuff Size: Adult Regular)   Pulse 85   Resp 17   Ht 1.58 m (5' 2.21\")   Wt 61.2 kg (135 lb)   LMP 11/10/2016 (Exact Date)   SpO2 99%   BMI 24.53 kg/m    Body mass index is 24.53 kg/m .  Physical Exam   GENERAL: healthy, alert and no distress  RESP: lungs clear to auscultation - no rales, rhonchi or wheezes  CV: regular rate and rhythm, normal S1 S2, no S3 or S4, no murmur, click or rub  MS: no gross musculoskeletal defects noted, no edema  NEURO: Normal strength and tone, mentation intact and speech normal    "

## 2022-02-18 NOTE — LETTER
February 18, 2022      Di Higgins  [address]  [facility] [address]        To Whom It May Concern:    Di Higgins was seen in our clinic and has been unwell since 2/16/22. She may return to work on 2/21/22 without restrictions.      Sincerely,        Amilcar Acosta MD

## 2022-04-09 ENCOUNTER — HEALTH MAINTENANCE LETTER (OUTPATIENT)
Age: 47
End: 2022-04-09

## 2022-04-16 ENCOUNTER — MYC MEDICAL ADVICE (OUTPATIENT)
Dept: FAMILY MEDICINE | Facility: CLINIC | Age: 47
End: 2022-04-16
Payer: COMMERCIAL

## 2022-04-16 DIAGNOSIS — I10 HYPERTENSION GOAL BP (BLOOD PRESSURE) < 140/90: Primary | ICD-10-CM

## 2022-04-18 RX ORDER — HYDROCHLOROTHIAZIDE 12.5 MG/1
12.5 TABLET ORAL DAILY
COMMUNITY
Start: 2022-02-16 | End: 2022-04-18

## 2022-04-18 RX ORDER — HYDROCHLOROTHIAZIDE 12.5 MG/1
12.5 TABLET ORAL DAILY
Qty: 90 TABLET | Refills: 0 | Status: SHIPPED | OUTPATIENT
Start: 2022-04-18 | End: 2022-05-27

## 2022-04-18 NOTE — TELEPHONE ENCOUNTER
Signed Prescriptions:                        Disp   Refills    hydrochlorothiazide (HYDRODIURIL) 12.5 MG *                Sig: Take 12.5 mg by mouth daily  Authorizing Provider: PATIENT REPORTED  Ordering User: IRINA BAL    Future Appointments 4/18/2022 - 10/15/2022              Date Visit Type Length Department Provider     5/2/2022  5:00 PM [facility] OFFICE VISIT  20 min  F[facility]ILY PRACTICE Amilcar Acosta MD    Location Instructions:     [facility] has 2 buildings on its campus. Please visit us at [address] [place] [address] and enter the building with the numbers [phone] on it. Check in on 2nd Floor.                 Routing to PCP as medication was not on med list had to be pulled from outside pharmacy.     Medication was originally prescribed by [facility] provider.    Irina Bal MA

## 2022-05-27 ENCOUNTER — VIRTUAL VISIT (OUTPATIENT)
Dept: FAMILY MEDICINE | Facility: CLINIC | Age: 47
End: 2022-05-27
Payer: COMMERCIAL

## 2022-05-27 DIAGNOSIS — J45.40 MODERATE PERSISTENT ASTHMA, UNSPECIFIED WHETHER COMPLICATED: ICD-10-CM

## 2022-05-27 DIAGNOSIS — I10 HYPERTENSION GOAL BP (BLOOD PRESSURE) < 140/90: ICD-10-CM

## 2022-05-27 DIAGNOSIS — Z12.11 COLON CANCER SCREENING: Primary | ICD-10-CM

## 2022-05-27 PROCEDURE — 99213 OFFICE O/P EST LOW 20 MIN: CPT | Mod: 95 | Performed by: FAMILY MEDICINE

## 2022-05-27 RX ORDER — HYDROCHLOROTHIAZIDE 12.5 MG/1
12.5 TABLET ORAL DAILY
Qty: 90 TABLET | Refills: 3 | Status: SHIPPED | OUTPATIENT
Start: 2022-05-27 | End: 2023-06-09

## 2022-05-27 RX ORDER — ALBUTEROL SULFATE 90 UG/1
AEROSOL, METERED RESPIRATORY (INHALATION)
Qty: 18 G | Refills: 5 | Status: SHIPPED | OUTPATIENT
Start: 2022-05-27 | End: 2022-08-22

## 2022-05-27 NOTE — PROGRESS NOTES
Di is a 46 year old who is being evaluated via a billable video visit.    How would you like to obtain your AVS? MyChart  If the video visit is dropped, the invitation should be resent by: Text to cell phone: .  Will anyone else be joining your video visit? No      Video Start Time: 1:14 PM    Assessment & Plan     Hypertension goal BP (blood pressure) < 140/90   BP well controlled. BMP from 2/2022 was normal. Refill medication.  - hydrochlorothiazide (HYDRODIURIL) 12.5 MG tablet; Take 1 tablet (12.5 mg) by mouth daily    Moderate persistent asthma, unspecified whether complicated  Asthma been well controlled, having challenges with Breo Ellipta; insurance saying not hit her deductible for it to be covered. Might consider alternative if will not be covered  - albuterol (PROAIR HFA/PROVENTIL HFA/VENTOLIN HFA) 108 (90 Base) MCG/ACT inhaler; INHALE 2 PUFFS INTO THE LUNGS EVERY 6 HOURS  - fluticasone-vilanterol (BREO ELLIPTA) 100-25 MCG/INH inhaler; Inhale 1 puff into the lungs daily    Return in about 1 month (around 6/27/2022) for Physical Exam.    Amilcar Acosta MD  [facility] LAZ Sevilla is a 46 year old who presents for the following health issues   History of Present Illness     Asthma:  She presents for follow up of asthma.  She has no cough, no wheezing, and no shortness of breath. She is using a relief medication a few times a week. She does not miss any doses of her controller medication throughout the week.Patient is aware of the following triggers: emotions. The patient has not had a visit to the Emergency Room, Urgent Care or Hospital due to asthma since the last clinic visit.     Hypertension: She presents for follow up of hypertension.  She does check blood pressure  regularly outside of the clinic. Outpatient blood pressures have not been over 140/90. She follows a low salt diet.       Nebulizer; not used this year.  Inhaler; uses daily once a day  Breo  Ellipta: being charged $300    HTN;   on hydrochlorothiazide.    Basic Metab Profile  Order: [id number]   Ref Range & Units 3 mo ago   SODIUM 136 - 145 mmol/L 141    POTASSIUM 3.5 - 5.1 mmol/L 3.8    CHLORIDE 98 - 107 mmol/L 105    CARBON DIOXIDE 21 - 32 mmol/L 27    BUN (UREA NITRO) 7 - 18 mg/dL 12    CREATININE 0.55 - 1.02 mg/dL 0.97    EST GFR (CKD-EPI) >60.00 mL/min >60.00    EST GFR IF AFRICAN AM >60.00 mL/min >60.00    GLUCOSE 70 - 110 mg/dL 95    CALCIUM, SERUM 8.5 - 10.1 mg/dL 9.2    ANION GAP 0.0 - 15.0 mmol/L 9.0    Resulting Agency  [facility]   Specimen Collected: 02/15/22  7:12 PM Last Resulted: 02/15/22  7:32 PM     Review of Systems         Objective           Vitals:  No vitals were obtained today due to virtual visit.    Physical Exam       Video-Visit Details    Type of service:  Video Visit    Video End Time:1.26 PM    Originating Location (pt. Location): Home    Distant Location (provider location):  [facility]     Platform used for Video Visit: [employer] inTouch

## 2022-07-30 ENCOUNTER — HEALTH MAINTENANCE LETTER (OUTPATIENT)
Age: 47
End: 2022-07-30

## 2022-08-22 DIAGNOSIS — J45.40 MODERATE PERSISTENT ASTHMA, UNSPECIFIED WHETHER COMPLICATED: ICD-10-CM

## 2022-08-22 RX ORDER — ALBUTEROL SULFATE 90 UG/1
AEROSOL, METERED RESPIRATORY (INHALATION)
Qty: 18 G | Refills: 0 | Status: SHIPPED | OUTPATIENT
Start: 2022-08-22 | End: 2022-09-16

## 2022-08-22 NOTE — TELEPHONE ENCOUNTER
Routing refill request to provider for review/approval because:  Failed Protocol    Vanesa Rincon RN BSN  [facility]

## 2022-09-15 DIAGNOSIS — J45.40 MODERATE PERSISTENT ASTHMA, UNSPECIFIED WHETHER COMPLICATED: ICD-10-CM

## 2022-09-16 RX ORDER — ALBUTEROL SULFATE 90 UG/1
AEROSOL, METERED RESPIRATORY (INHALATION)
Qty: 18 G | Refills: 1 | Status: SHIPPED | OUTPATIENT
Start: 2022-09-16 | End: 2023-06-05

## 2022-10-10 ENCOUNTER — HEALTH MAINTENANCE LETTER (OUTPATIENT)
Age: 47
End: 2022-10-10

## 2023-04-19 ENCOUNTER — ANCILLARY PROCEDURE (OUTPATIENT)
Dept: MAMMOGRAPHY | Facility: CLINIC | Age: 48
End: 2023-04-19
Attending: FAMILY MEDICINE
Payer: COMMERCIAL

## 2023-04-19 DIAGNOSIS — Z12.31 VISIT FOR SCREENING MAMMOGRAM: ICD-10-CM

## 2023-04-19 PROCEDURE — 77067 SCR MAMMO BI INCL CAD: CPT | Mod: TC | Performed by: RADIOLOGY

## 2023-04-28 ENCOUNTER — TELEPHONE (OUTPATIENT)
Dept: GASTROENTEROLOGY | Facility: CLINIC | Age: 48
End: 2023-04-28
Payer: COMMERCIAL

## 2023-04-28 NOTE — TELEPHONE ENCOUNTER
Screening Questions  BLUE  KIND OF PREP RED  LOCATION [review exclusion criteria] GREEN  SEDATION TYPE        y Are you active on mychart?       [place] Ordering/Referring Provider?        BCBS What type of coverage do you have?      n Have you had a positive covid test in the last 14 days?     26.0 1. BMI  [BMI 40+ - review exclusion criteria& smart-phrase document]    y  2. Are you able to give consent for your medical care? [IF NO,RN REVIEW]          n  3. Are you taking any prescription pain medications on a routine schedule   (ex narcotics: oxycodone, roxicodone, oxycontin,  and percocet)? [RN Review]        n  3a. EXTENDED PREP What kind of prescription?     n 4. Do you have any chemical dependencies such as alcohol, street drugs, or methadone?        **If yes 3- 5 , please schedule with MAC sedation.**          IF YES TO ANY 6 - 10 - HOSPITAL SETTING ONLY.     n 6.   Do you need assistance transferring?     n 7.   Have you had a heart or lung transplant?    n 8.   Are you currently on dialysis?   n 9.   Do you use daily home oxygen?   n 10. Do you take nitroglycerin?   10a. n If yes, how often?     11. [FEMALES]  n Are you currently pregnant?    11a. n If yes, how many weeks? [ Greater than 12 weeks, OR NEEDED]    n 12. Do you have Pulmonary Hypertension? *NEED PAC APPT AT [facility] w/ MAC*     n 13. [review exclusion criteria]  Do you have any implantable devices in your body (pacemaker, defib, LVAD)?    n 14. In the past 6 months, have you had any heart related issues including cardiomyopathy or heart attack?     14a. n If yes, did it require cardiac stenting if so when?     n 15. Have you had a stroke or Transient ischemic attack (TIA - aka  mini stroke ) within 6 months?      n 16. Do you have mod to severe Obstructive Sleep Apnea?  [Hospital only]    n 17. Do you have SEVERE AND UNCONTROLLED asthma? *NEED PAC APPT AT [facility] w/MAC*     18. Are you currently taking any blood thinners?     18a. No. Continue  "to 19.   18b.     n 19. Do you take the medication Phentermine?    19a. If yes, \"Hold for 7 days before procedure.  Please consult your prescribing provider if you have questions about holding this medication.\"     n  20. Do you have chronic kidney disease?      n  21. Do you have a diagnosis of diabetes?     n  22. On a regular basis do you go 3-5 days between bowel movements?      23. Preferred LOCAL Pharmacy for Pre Prescription    [ LIST ONLY ONE PHARMACY]          [id number] - [facility], [place] - [address]  & [facility]        - CLOSING REMINDERS -    Informed patient they will need an adult    Cannot take any type of public or medical transportation alone    Conscious Sedation- Needs  for 6 hours after the procedure       MAC/General-Needs  for 24 hours after procedure    Pre-Procedure Covid test to be completed [[facility] PCR Testing Required]    Confirmed Nurse will call to complete assessment       - SCHEDULING DETAILS -  n Hospital Setting Required? If yes, what is the exclusion?:    Linda    7/14 Date of Procedure  Lower Endoscopy [Colonoscopy]  [facility]   MIRALAX GATORADE WITHOUT MAGNEISUM CITRATE Which Colonoscopy Prep was Sent?     CS Sedation Type     n PAC / Pre-op Required                 "

## 2023-05-25 ENCOUNTER — OFFICE VISIT (OUTPATIENT)
Dept: OBGYN | Facility: CLINIC | Age: 48
End: 2023-05-25
Payer: COMMERCIAL

## 2023-05-25 VITALS
DIASTOLIC BLOOD PRESSURE: 81 MMHG | SYSTOLIC BLOOD PRESSURE: 125 MMHG | HEART RATE: 70 BPM | HEIGHT: 61 IN | BODY MASS INDEX: 31.17 KG/M2 | OXYGEN SATURATION: 98 % | WEIGHT: 165.1 LBS

## 2023-05-25 DIAGNOSIS — L91.8 SKIN TAG: ICD-10-CM

## 2023-05-25 DIAGNOSIS — Z01.411 ENCOUNTER FOR GYNECOLOGICAL EXAMINATION WITH ABNORMAL FINDING: Primary | ICD-10-CM

## 2023-05-25 PROCEDURE — 99386 PREV VISIT NEW AGE 40-64: CPT | Performed by: OBSTETRICS & GYNECOLOGY

## 2023-05-25 NOTE — PROGRESS NOTES
Di Higgins is a 47 year old female , who presents for annual exam.   No unusual bleeding, no incontinence, or unusual discharge.     Last cholesterol: Recent Labs   Lab Test 21  0811 11/06/15  0724   CHOL 174 152   HDL 45* 39*   * 100   TRIG 89 64   CHOLHDLRATIO  --  3.9      Ref Range & Units 1 yr ago   SODIUM 136 - 145 mmol/L 141    POTASSIUM 3.5 - 5.1 mmol/L 3.8    CHLORIDE 98 - 107 mmol/L 105    CARBON DIOXIDE 21 - 32 mmol/L 27    BUN (UREA NITRO) 7 - 18 mg/dL 12    CREATININE 0.55 - 1.02 mg/dL 0.97    EST GFR (CKD-EPI) >60.00 mL/min >60.00    EST GFR IF AFRICAN AM >60.00 mL/min >60.00    GLUCOSE 70 - 110 mg/dL 95    CALCIUM, SERUM 8.5 - 10.1 mg/dL 9.2    ANION GAP 0.0 - 15.0 mmol/L 9.0          Past Medical History:   Diagnosis Date     Allergic rhinitis due to animal dander      Diagnostic skin and sensitization tests  IgE tests pos. for:  pos. for: cat/dog(+)/DM/M/T/RW    tests per [facility]     GERD (gastroesophageal reflux disease)      visit, GERD with epigastric pain--Neg. H. Pylori     House dust mite allergy      Moderate persistent asthma  OyC=975    at new pt visit of , pt admited no controller meds x 1 yr.     Rhinitis, allergic to other allergen      Seasonal allergic rhinitis      IgE tests pos. for: cat/dog(+)/DM/M/T/RW     Vitamin D deficiency  noted--started on supplement       Past Surgical History:   Procedure Laterality Date     HYSTERECTOMY, PAP NO LONGER INDICATED  2016     HYSTERECTOMY, VAGINAL  2016    uterine leiomyoma, adenomyosis     TUBAL LIGATION      laparoscopic tubal       OB History    Para Term  AB Living   5 3 0 0 2 3   SAB IAB Ectopic Multiple Live Births   0 2 0 0 0      # Outcome Date GA Lbr Pedro/2nd Weight Sex Delivery Anes PTL Lv   5 IAB            4 IAB            3 Para            2 Para            1 Para                Gyn History:  Gynecological History         Patient's last menstrual period  was 11/10/2016 (exact date).  She has had a hysterectomy      No STD/No PID/No IUD      history of abnormal pap smear:  No  Last pap:   Lab Results   Component Value Date    PAP NIL 11/09/2015           Current Outpatient Medications   Medication Sig Dispense Refill     albuterol (PROAIR HFA/PROVENTIL HFA/VENTOLIN HFA) 108 (90 Base) MCG/ACT inhaler INHALE 2 PUFFS INTO THE LUNGS EVERY 6 HOURS 18 g 1     albuterol (PROVENTIL) (2.5 MG/3ML) 0.083% neb solution USE 1 VIAL VIA NEBULIZER EVERY 6 HOURS AS NEEDED FOR SHORTNESS OF BREATH OR DIFFICULT BREATHING OR WHEEZING 75 mL 1     hydrochlorothiazide (HYDRODIURIL) 12.5 MG tablet Take 1 tablet (12.5 mg) by mouth daily 90 tablet 3     order for DME Equipment being ordered: Nebulizer 1 Device 0     fluticasone-vilanterol (BREO ELLIPTA) 100-25 MCG/INH inhaler Inhale 1 puff into the lungs daily (Patient not taking: Reported on 5/25/2023) 60 each 5       Allergies   Allergen Reactions     Penicillin V        Social History     Socioeconomic History     Marital status:      Spouse name: Not on file     Number of children: 3     Years of education: 16     Highest education level: Not on file   Occupational History     Not on file   Tobacco Use     Smoking status: Former     Smokeless tobacco: Former     Quit date: 1/2/1985   Vaping Use     Vaping status: Never Used     Passive vaping exposure: Yes   Substance and Sexual Activity     Alcohol use: Yes     Comment: occasional     Drug use: No     Sexual activity: Yes     Partners: Male     Birth control/protection: Female Surgical   Other Topics Concern     Parent/sibling w/ CABG, MI or angioplasty before 65F 55M? No      Service Yes     Comment: [employer]     Blood Transfusions No     Caffeine Concern Not Asked     Occupational Exposure No     Hobby Hazards No     Sleep Concern Not Asked     Comment: sometimes      Stress Concern No     Weight Concern Not Asked     Special Diet Not Asked     Back Care Not Asked  "    Exercise Yes     Bike Helmet No     Comment: she has helmet but does not wear it.      Seat Belt Yes     Self-Exams Yes   Social History Narrative     Not on file     Social Determinants of Health     Financial Resource Strain: Not on file   Food Insecurity: Not on file   Transportation Needs: Not on file   Physical Activity: Not on file   Stress: Not on file   Social Connections: Not on file   Intimate Partner Violence: Not on file   Housing Stability: Not on file       Family History   Problem Relation Age of Onset     Asthma Father      Neurologic Disorder Sister         MS         ROS:  All negative except as above.      EXAM:  BP (!) 160/122 (BP Location: Right arm, Cuff Size: Adult Regular)   Pulse 70   Ht 1.561 m (5' 1.45\")   Wt 74.9 kg (165 lb 1.6 oz)   LMP 11/10/2016 (Exact Date)   SpO2 98%   BMI 30.74 kg/m     Repeat blood pressure 125/81  General:  WNWD female, NAD  Alert  Oriented x 3  Gait:  Normal  Skin:  Normal skin turgor  Neurologic:  CN grossly intact, good sensation.    HEENT:  NC/AT, EOMI  Neck:  No masses palpated, symmetrical, carotids +2/4, no bruits heard  Heart:  RRR  Lungs:  Clear   Breasts:  Symmetrical, no dimpling noted, no masses palpated, no discharge expressed  Abdomen:  Non-tender, non-distended.  Vulva: skin tag noted on the right lateral labia majora about 4 mm, medial to the leg/pelvis junction, normal hair distribution, no adenopathy  BUS:  Normal, no masses noted  Urethra:  No hypermobility noted  Urethral meatus:  No masses noted  Vagina: Moist, pink, no abnormal discharge, well rugated, no lesions  Cervix: absent   Uterus: absent   Ovaries/Bimanual: No mass, non-tender, mobile  Perianal:  No masses noted.    Extremities:  No clubbing, cyanosis, or edema      ASSESSMENT/PLAN   Annual examination   She has the colonoscopy scheduled.   Skin tag:  She will schedule for the removal.  We reviewed excision as well as suture strangulation.    Low fat diet, weight bearing " exercises and self breast exams on a monthly basis have been recommended.  I have discussed with patient the risks, benefits, medications, treatment options and modalities.   I have instructed the patient to call or schedule a follow-up appointment if any problems.

## 2023-06-04 DIAGNOSIS — J45.40 MODERATE PERSISTENT ASTHMA, UNSPECIFIED WHETHER COMPLICATED: ICD-10-CM

## 2023-06-05 RX ORDER — ALBUTEROL SULFATE 90 UG/1
AEROSOL, METERED RESPIRATORY (INHALATION)
Qty: 18 G | Refills: 1 | Status: SHIPPED | OUTPATIENT
Start: 2023-06-05 | End: 2023-08-25

## 2023-06-14 ENCOUNTER — OFFICE VISIT (OUTPATIENT)
Dept: OBGYN | Facility: CLINIC | Age: 48
End: 2023-06-14
Payer: COMMERCIAL

## 2023-06-14 VITALS — WEIGHT: 166.4 LBS | DIASTOLIC BLOOD PRESSURE: 86 MMHG | SYSTOLIC BLOOD PRESSURE: 128 MMHG | BODY MASS INDEX: 30.98 KG/M2

## 2023-06-14 DIAGNOSIS — L91.8 SKIN TAG: Primary | ICD-10-CM

## 2023-06-14 PROCEDURE — 11200 RMVL SKIN TAGS UP TO&INC 15: CPT | Performed by: OBSTETRICS & GYNECOLOGY

## 2023-06-14 NOTE — PROGRESS NOTES
PROCEDURE NOTE  Di is a 47 year old female, .  She presents today with a labia majora skin tag that she would like to have removed.  It interferes with her activities and work.    She is counseled about the removal and the options for removal.  These include the strangulation with suture or surgical removal with the scalpel.  She is agreeable to the strangulation of the skin tag.    The right lateral labia majora was prepped with Betadine.  1% lidocaine with epinephrine was placed.  The skin tag was elevated with the tissue pickups.  The 2-0 Vicryl suture was placed around the base of the skin tag and the suture was cinched down and suture ligated. It will likely fall off in about a week.  She tolerated the procedure well.      Aquiles Maria MD

## 2023-06-14 NOTE — LETTER
June 14, 2023      Di Higgins  [address]  [facility] [place] [address]        To Whom It May Concern:    Di Higgins was seen in our clinic today from 8:30 to 9:25. She may return to work              Sincerely,        Aquiles Maria MD

## 2023-07-03 ENCOUNTER — TELEPHONE (OUTPATIENT)
Dept: GASTROENTEROLOGY | Facility: CLINIC | Age: 48
End: 2023-07-03

## 2023-07-03 NOTE — TELEPHONE ENCOUNTER
Attempted to contact patient in order to complete pre assessment questions.     No answer. Left message to return call to [phone] option 4        Procedure details:    Patient scheduled for Colonoscopy  on 07.14.2023.     Arrival time: 0910. Procedure time 0955    Pre op exam needed? N/A    Facility location: [facility] [place]; [address], [place] [address]    Sedation type: Conscious sedation     Indication for procedure: Colon cancer screening      Chart review:     Electronic implanted devices? No    Diabetic? No    Diabetic medication HOLDING recommendations: (if applicable)  Oral diabetic medications: N/A  Diabetic injectables: N/A  Insulin: N/A      Medication review:    Anticoagulants? No    NSAIDS? No NSAID medications per patient's medication list.  RN will verify with pre-assessment call.    Other medication HOLDING recommendations:  N/A      Prep for procedure:     Bowel prep recommendation: Miralax prep without magnesium citrate    Prep instructions sent via [email or web address].     Tawana George RN  Endoscopy Procedure Pre Assessment RN

## 2023-07-07 RX ORDER — NALOXONE HYDROCHLORIDE 0.4 MG/ML
0.4 INJECTION, SOLUTION INTRAMUSCULAR; INTRAVENOUS; SUBCUTANEOUS
Status: CANCELLED | OUTPATIENT
Start: 2023-07-07

## 2023-07-07 RX ORDER — NALOXONE HYDROCHLORIDE 0.4 MG/ML
0.2 INJECTION, SOLUTION INTRAMUSCULAR; INTRAVENOUS; SUBCUTANEOUS
Status: CANCELLED | OUTPATIENT
Start: 2023-07-07

## 2023-07-07 RX ORDER — FLUMAZENIL 0.1 MG/ML
0.2 INJECTION, SOLUTION INTRAVENOUS
Status: CANCELLED | OUTPATIENT
Start: 2023-07-07 | End: 2023-07-07

## 2023-07-07 RX ORDER — PROCHLORPERAZINE MALEATE 10 MG
10 TABLET ORAL EVERY 6 HOURS PRN
Status: CANCELLED | OUTPATIENT
Start: 2023-07-07

## 2023-07-07 RX ORDER — ONDANSETRON 2 MG/ML
4 INJECTION INTRAMUSCULAR; INTRAVENOUS EVERY 6 HOURS PRN
Status: CANCELLED | OUTPATIENT
Start: 2023-07-07

## 2023-07-07 RX ORDER — ONDANSETRON 4 MG/1
4 TABLET, ORALLY DISINTEGRATING ORAL EVERY 6 HOURS PRN
Status: CANCELLED | OUTPATIENT
Start: 2023-07-07

## 2023-07-07 NOTE — TELEPHONE ENCOUNTER
Pre assessment completed for upcoming procedure.    Procedure details:    Arrival time and facility location reviewed.    Pre op exam needed? N/A    Designated  policy reviewed. Instructed to have someone stay 6 hours post procedure.     COVID policy reviewed.      Chart review:     Electronic implanted devices? No    Diabetic? No    Medication review:    Diabetic medication HOLDING recommendations: (if applicable)  Oral diabetic medications: N/A  Diabetic injectables: N/A  Insulin: N/A    Anticoagulants? No    NSAIDS? Yes.  Ibuprofen (Advil, Motrin).  Holding interval of 1 day before procedure.    Other medication HOLDING recommendations:  N/A      Prep for procedure:     Reviewed procedure prep instructions.     Patient verbalized understanding and had no questions or concerns at this time.        Yajaira Yang RN  Endoscopy Procedure Pre Assessment RN  [phone] option 4

## 2023-07-14 ENCOUNTER — HOSPITAL ENCOUNTER (OUTPATIENT)
Facility: AMBULATORY SURGERY CENTER | Age: 48
Discharge: HOME | End: 2023-07-14
Attending: INTERNAL MEDICINE | Admitting: INTERNAL MEDICINE
Payer: COMMERCIAL

## 2023-07-14 VITALS
DIASTOLIC BLOOD PRESSURE: 86 MMHG | TEMPERATURE: 97.5 F | OXYGEN SATURATION: 99 % | SYSTOLIC BLOOD PRESSURE: 132 MMHG | HEART RATE: 74 BPM | RESPIRATION RATE: 14 BRPM

## 2023-07-14 LAB — COLONOSCOPY: NORMAL

## 2023-07-14 PROCEDURE — G8918 PT W/O PREOP ORDER IV AB PRO: HCPCS

## 2023-07-14 PROCEDURE — 88305 TISSUE EXAM BY PATHOLOGIST: CPT | Performed by: PATHOLOGY

## 2023-07-14 PROCEDURE — G8907 PT DOC NO EVENTS ON DISCHARG: HCPCS

## 2023-07-14 PROCEDURE — 45385 COLONOSCOPY W/LESION REMOVAL: CPT

## 2023-07-14 RX ORDER — FENTANYL CITRATE 50 UG/ML
INJECTION, SOLUTION INTRAMUSCULAR; INTRAVENOUS PRN
Status: DISCONTINUED | OUTPATIENT
Start: 2023-07-14 | End: 2023-07-14 | Stop reason: HOSPADM

## 2023-07-14 RX ORDER — ONDANSETRON 2 MG/ML
4 INJECTION INTRAMUSCULAR; INTRAVENOUS
Status: DISCONTINUED | OUTPATIENT
Start: 2023-07-14 | End: 2023-07-15 | Stop reason: HOSPADM

## 2023-07-14 RX ORDER — LIDOCAINE 40 MG/G
CREAM TOPICAL
Status: DISCONTINUED | OUTPATIENT
Start: 2023-07-14 | End: 2023-07-15 | Stop reason: HOSPADM

## 2023-07-14 NOTE — H&P
[facility] Anesthesia Pre-op History and Physical    Di Higgins MRN# [id number]   Age: 47 year old YOB: 1975            Date of Exam 7/14/2023         Primary care provider: Amilcar Acosta         Chief Complaint and/or Reason for Procedure:     CRC screening - first colonoscopy         Active problem list:     Patient Active Problem List    Diagnosis Date Noted     Moderate persistent asthma without complication 01/25/2022     Priority: Medium     Nonintractable paroxysmal hemicrania 09/13/2016     Priority: Medium     Diagnostic skin and sensitization tests      Priority: Medium     tests per [facility]       Moderate persistent asthma 06/28/2011     Priority: Medium     CARDIOVASCULAR SCREENING; LDL GOAL LESS THAN 160 06/28/2011     Priority: Medium     GERD (gastroesophageal reflux disease)      Priority: Medium     Seasonal allergic rhinitis      Priority: Medium     Allergic rhinitis due to animal dander      Priority: Medium     House dust mite allergy      Priority: Medium     Rhinitis, allergic to other allergen      Priority: Medium     IMO update changed this record. Please review for accuracy       Vitamin D deficiency      Priority: Medium            Medications (include herbals and vitamins):   Any Plavix use in the last 7 days? No     Current Outpatient Medications   Medication Sig     albuterol (PROVENTIL) (2.5 MG/3ML) 0.083% neb solution USE 1 VIAL VIA NEBULIZER EVERY 6 HOURS AS NEEDED FOR SHORTNESS OF BREATH OR DIFFICULT BREATHING OR WHEEZING     albuterol (VENTOLIN HFA) 108 (90 Base) MCG/ACT inhaler INHALE 2 PUFFS INTO THE LUNGS EVERY 6 HOURS     hydrochlorothiazide (HYDRODIURIL) 12.5 MG tablet TAKE 1 TABLET(12.5 MG) BY MOUTH DAILY     fluticasone-vilanterol (BREO ELLIPTA) 100-25 MCG/INH inhaler Inhale 1 puff into the lungs daily (Patient not taking: Reported on 5/25/2023)     order for DME Equipment being ordered: Nebulizer     Current Facility-Administered Medications    Medication     lidocaine (LMX4) kit     lidocaine 1 % 0.1-1 mL     ondansetron (ZOFRAN) injection 4 mg     sodium chloride (PF) 0.9% PF flush 3 mL     sodium chloride (PF) 0.9% PF flush 3 mL             Allergies:      Allergies   Allergen Reactions     Penicillin V      Allergy to Latex? No  Allergy to tape?   No  Intolerances:             Physical Exam:   All vitals have been reviewed  Patient Vitals for the past 8 hrs:   BP Temp Temp src Resp SpO2   07/14/23 0910 133/85 97.5  F (36.4  C) Temporal 16 97 %     No intake/output data recorded.  Lungs:   No increased work of breathing, good air exchange, clear to auscultation bilaterally, no crackles or wheezing     Cardiovascular:   normal S1 and S2             Lab / Radiology Results:            Anesthetic risk and/or ASA classification:   2    Debby Delatorre, DO

## 2023-07-18 LAB
PATH REPORT.COMMENTS IMP SPEC: NORMAL
PATH REPORT.COMMENTS IMP SPEC: NORMAL
PATH REPORT.FINAL DX SPEC: NORMAL
PATH REPORT.GROSS SPEC: NORMAL
PATH REPORT.MICROSCOPIC SPEC OTHER STN: NORMAL
PATH REPORT.RELEVANT HX SPEC: NORMAL
PHOTO IMAGE: NORMAL

## 2023-08-22 DIAGNOSIS — I10 HYPERTENSION GOAL BP (BLOOD PRESSURE) < 140/90: ICD-10-CM

## 2023-08-23 RX ORDER — HYDROCHLOROTHIAZIDE 12.5 MG/1
TABLET ORAL
Qty: 30 TABLET | Refills: 0 | Status: SHIPPED | OUTPATIENT
Start: 2023-08-23 | End: 2023-11-03

## 2023-08-25 DIAGNOSIS — J45.40 MODERATE PERSISTENT ASTHMA, UNSPECIFIED WHETHER COMPLICATED: ICD-10-CM

## 2023-08-25 RX ORDER — ALBUTEROL SULFATE 90 UG/1
AEROSOL, METERED RESPIRATORY (INHALATION)
Qty: 18 G | Refills: 0 | Status: SHIPPED | OUTPATIENT
Start: 2023-08-25 | End: 2023-09-20

## 2023-09-18 DIAGNOSIS — J45.40 MODERATE PERSISTENT ASTHMA, UNSPECIFIED WHETHER COMPLICATED: ICD-10-CM

## 2023-09-20 RX ORDER — ALBUTEROL SULFATE 90 UG/1
AEROSOL, METERED RESPIRATORY (INHALATION)
Qty: 18 G | Refills: 1 | Status: SHIPPED | OUTPATIENT
Start: 2023-09-20 | End: 2023-11-03

## 2023-11-03 ENCOUNTER — OFFICE VISIT (OUTPATIENT)
Dept: FAMILY MEDICINE | Facility: CLINIC | Age: 48
End: 2023-11-03
Payer: COMMERCIAL

## 2023-11-03 VITALS
BODY MASS INDEX: 30.95 KG/M2 | WEIGHT: 168.2 LBS | OXYGEN SATURATION: 100 % | SYSTOLIC BLOOD PRESSURE: 118 MMHG | RESPIRATION RATE: 18 BRPM | DIASTOLIC BLOOD PRESSURE: 81 MMHG | HEART RATE: 75 BPM | HEIGHT: 62 IN

## 2023-11-03 DIAGNOSIS — I10 HYPERTENSION GOAL BP (BLOOD PRESSURE) < 140/90: ICD-10-CM

## 2023-11-03 DIAGNOSIS — Z13.6 CARDIOVASCULAR SCREENING; LDL GOAL LESS THAN 130: ICD-10-CM

## 2023-11-03 DIAGNOSIS — Z11.59 NEED FOR HEPATITIS C SCREENING TEST: ICD-10-CM

## 2023-11-03 DIAGNOSIS — J45.40 MODERATE PERSISTENT ASTHMA, UNSPECIFIED WHETHER COMPLICATED: Primary | ICD-10-CM

## 2023-11-03 PROCEDURE — 80048 BASIC METABOLIC PNL TOTAL CA: CPT | Performed by: FAMILY MEDICINE

## 2023-11-03 PROCEDURE — 86803 HEPATITIS C AB TEST: CPT | Performed by: FAMILY MEDICINE

## 2023-11-03 PROCEDURE — 80061 LIPID PANEL: CPT | Performed by: FAMILY MEDICINE

## 2023-11-03 PROCEDURE — 36415 COLL VENOUS BLD VENIPUNCTURE: CPT | Performed by: FAMILY MEDICINE

## 2023-11-03 PROCEDURE — 99214 OFFICE O/P EST MOD 30 MIN: CPT | Performed by: FAMILY MEDICINE

## 2023-11-03 RX ORDER — HYDROCHLOROTHIAZIDE 12.5 MG/1
12.5 TABLET ORAL DAILY
Qty: 90 TABLET | Refills: 0 | Status: SHIPPED | OUTPATIENT
Start: 2023-11-03 | End: 2024-01-12

## 2023-11-03 RX ORDER — DOXYCYCLINE HYCLATE 20 MG
TABLET ORAL
COMMUNITY
Start: 2023-10-25

## 2023-11-03 RX ORDER — ALBUTEROL SULFATE 90 UG/1
AEROSOL, METERED RESPIRATORY (INHALATION)
Qty: 18 G | Refills: 2 | Status: SHIPPED | OUTPATIENT
Start: 2023-11-03 | End: 2024-03-04

## 2023-11-03 ASSESSMENT — PAIN SCALES - GENERAL: PAINLEVEL: NO PAIN (0)

## 2023-11-03 ASSESSMENT — ASTHMA QUESTIONNAIRES: ACT_TOTALSCORE: 20

## 2023-11-03 NOTE — PROGRESS NOTES
"  Assessment & Plan   Di is a 49 yo F with asthma, seasonal allergies, headaches, gerd, mite allergy    Moderate persistent asthma, unspecified whether complicated   Her asthma has been quite well controlled for a while now; not used the controller medication for over 1 year  - albuterol (PROAIR HFA/PROVENTIL HFA/VENTOLIN HFA) 108 (90 Base) MCG/ACT inhaler; INHALE 2 PUFFS INTO THE LUNGS EVERY 6 HOURS    Need for hepatitis C screening test  - Hepatitis C Screen Reflex to HCV RNA Quant and Genotype; Future  - Hepatitis C Screen Reflex to HCV RNA Quant and Genotype    Hypertension goal BP (blood pressure) < 140/90   BP been well controlled, she is interested in holding medication and see how the BP would respond; will hold for 2 week and monitor BP closely and would up date with readings.  - Basic metabolic panel  (Ca, Cl, CO2, Creat, Gluc, K, Na, BUN); Future  - hydrochlorothiazide (HYDRODIURIL) 12.5 MG tablet; Take 1 tablet (12.5 mg) by mouth daily  - Basic metabolic panel  (Ca, Cl, CO2, Creat, Gluc, K, Na, BUN)    CARDIOVASCULAR SCREENING; LDL GOAL LESS THAN 130   Due for LDL check  - Lipid Profile (Chol, Trig, HDL, LDL calc); Future  - Lipid Profile (Chol, Trig, HDL, LDL calc)     BMI:   Estimated body mass index is 30.37 kg/m  as calculated from the following:    Height as of this encounter: 1.585 m (5' 2.4\").    Weight as of this encounter: 76.3 kg (168 lb 3.2 oz).   Weight management plan: Discussed healthy diet and exercise guidelines    See Patient Instructions    Amilcar Acosta MD  [facility] LAZ Sevilla is a 48 year old, presenting for the following health issues:  Recheck Medication      HTN:   Hydrochlorothiazide    Asthma:    Doing only albuterol as needed (once every other days)   Not needed Breo Ellipta for a couple of years.    Flu shot 10/1/23  Colonoscopy: completed      HPI       Cardiovascular goal    Are you regularly taking any medication or " "supplement to lower your cholesterol?   No  Are you having muscle aches or other side effects that you think could be caused by your cholesterol lowering medication?  No    Hypertension Follow-up    Do you check your blood pressure regularly outside of the clinic? No   Are you following a low salt diet? Yes  Are your blood pressures ever more than 140 on the top number (systolic) OR more   than 90 on the bottom number (diastolic), for example 140/90? No    Asthma Follow-Up    Was ACT completed today?  Yes        11/3/2023     2:44 PM   ACT Total Scores   ACT TOTAL SCORE (Goal Greater than or Equal to 20) 20   In the past 12 months, how many times did you visit the emergency room for your asthma without being admitted to the hospital? 0   In the past 12 months, how many times were you hospitalized overnight because of your asthma? 0        How many days per week do you miss taking your asthma controller medication?  0  Please describe any recent triggers for your asthma: Patient is unaware of triggers  Have you had any Emergency Room Visits, Urgent Care Visits, or Hospital Admissions since your last office visit?  No    Healthcare Maintenance:     Flu shot 10/1/23     Colonoscopy: completed     Hep C: will complete today      Review of Systems   Constitutional, HEENT, cardiovascular, pulmonary, gi and gu systems are negative, except as otherwise noted.      Objective    /81 (BP Location: Left arm)   Pulse 75   Resp 18   Ht 1.585 m (5' 2.4\")   Wt 76.3 kg (168 lb 3.2 oz)   LMP 11/10/2016 (Exact Date)   SpO2 100%   BMI 30.37 kg/m    Body mass index is 30.37 kg/m .  Physical Exam   GENERAL: healthy, alert and no distress  NECK: no adenopathy and thyroid normal to palpation  RESP: lungs clear to auscultation - no rales, rhonchi or wheezes  CV: regular rate and rhythm, normal S1 S2, no S3 or S4, no murmur, click or rub, no peripheral edema  ABDOMEN: soft, nontender, no hepatosplenomegaly, no masses and bowel " sounds normal  MS: no gross musculoskeletal defects noted, no edema  PSYCH: mentation appears normal, affect normal/bright

## 2023-11-04 LAB
ANION GAP SERPL CALCULATED.3IONS-SCNC: 10 MMOL/L (ref 7–15)
BUN SERPL-MCNC: 9.2 MG/DL (ref 6–20)
CALCIUM SERPL-MCNC: 9.8 MG/DL (ref 8.6–10)
CHLORIDE SERPL-SCNC: 102 MMOL/L (ref 98–107)
CHOLEST SERPL-MCNC: 216 MG/DL
CREAT SERPL-MCNC: 1.07 MG/DL (ref 0.51–0.95)
DEPRECATED HCO3 PLAS-SCNC: 27 MMOL/L (ref 22–29)
EGFRCR SERPLBLD CKD-EPI 2021: 63 ML/MIN/1.73M2
GLUCOSE SERPL-MCNC: 96 MG/DL (ref 70–99)
HCV AB SERPL QL IA: NONREACTIVE
HDLC SERPL-MCNC: 40 MG/DL
LDLC SERPL CALC-MCNC: 142 MG/DL
NONHDLC SERPL-MCNC: 176 MG/DL
POTASSIUM SERPL-SCNC: 3.7 MMOL/L (ref 3.4–5.3)
SODIUM SERPL-SCNC: 139 MMOL/L (ref 135–145)
TRIGL SERPL-MCNC: 171 MG/DL

## 2024-01-12 DIAGNOSIS — I10 HYPERTENSION GOAL BP (BLOOD PRESSURE) < 140/90: ICD-10-CM

## 2024-01-12 RX ORDER — HYDROCHLOROTHIAZIDE 12.5 MG/1
12.5 TABLET ORAL DAILY
Qty: 90 TABLET | Refills: 1 | Status: SHIPPED | OUTPATIENT
Start: 2024-01-12

## 2024-02-07 ENCOUNTER — TELEPHONE (OUTPATIENT)
Dept: FAMILY MEDICINE | Facility: CLINIC | Age: 49
End: 2024-02-07
Payer: COMMERCIAL

## 2024-02-12 ENCOUNTER — TELEPHONE (OUTPATIENT)
Dept: FAMILY MEDICINE | Facility: CLINIC | Age: 49
End: 2024-02-12
Payer: COMMERCIAL

## 2024-02-12 DIAGNOSIS — J45.20 MILD INTERMITTENT ASTHMA WITHOUT COMPLICATION: Primary | ICD-10-CM

## 2024-02-12 NOTE — TELEPHONE ENCOUNTER
Fax request received 2/4/24    Prior Authorization Retail Medication Request    Medication/Dose: ventolin inhaler  Diagnosis and ICD code (if different than what is on RX):    New/renewal/insurance change PA/secondary ins. PA:  Previously Tried and Failed:    Rationale:      Insurance [phone]  Primary: [id number]  Insurance ID:      Secondary (if applicable):  Insurance ID:      Pharmacy Information (if different than what is on RX)  Name:    Phone:    Fax:

## 2024-02-12 NOTE — TELEPHONE ENCOUNTER
Note: Due to record-high volumes, our turn-around is time taking longer than normal . We are currently 8 days behind in the pools.   We are working diligently to submit all requests in a timely manner and in the order they are received. Please only flag true urgent requests as high priority to the pool at this time.   If you have questions - please send a note/message in the active PA encounter and send back to the RPPA (Retail Pharmacy Prior Authorization) team [[id number]].    If you have more specific questions about our process please reach out to our supervisor Shira Louise.   Thank you!     PRIOR AUTHORIZATION DENIED    Medication: VENTOLIN  (90 BASE) MCG/ACT IN [employer]  Insurance Company: [employer] ([facility]) - Phone [phone] Fax [phone]  Denial Date: 2/12/2024  Denial Rational:           Appeal Information: N/A  Patient Notified: No

## 2024-02-12 NOTE — LETTER
February 16, 2024      Di Higgins  [address]  [place] [address]        To Whom It May Concern:    Di Higgins  in seen for on moderate persistent asthma. She has been taking Ventolin inhaler for a long time; she has tried Proair and Proventil inhalers but did not help control her symptoms when was having asthma flare ups. I am thus appealing on her behalf, against the decision by the insurance not to provide her with the Ventolin inhaler, which has prevented the asthma flare up she used to experience frequently.      If you have any questions or concerns you can call me at .      Sincerely,        Amilcar Acosta MD

## 2024-02-12 NOTE — TELEPHONE ENCOUNTER
Retail Pharmacy Prior Authorization Team   Phone: [phone]    PA Initiation    Medication: VENTOLIN  (90 BASE) MCG/ACT IN AERS  Insurance Company: [employer] ([facility]) - Phone [phone] Fax [phone]  Pharmacy Filling the Rx: [id number] - [place] [address] [place]  Filling Pharmacy Phone: [phone]  Filling Pharmacy Fax:    Start Date: 2/12/2024

## 2024-02-13 NOTE — TELEPHONE ENCOUNTER
Please let patient know that her insurance denied PA for Ventolin.  She says it is the only one she has used; she can appeal in which case insurance will usually that she has already tried generic and failed

## 2024-02-15 NOTE — TELEPHONE ENCOUNTER
Called pt she states Ventolin is the only inhaler that works for her and she has been on it for years.    Pt has tried an failed:  Albuterol 2.5 mg/3 mL neb solution  Proair Inhaler  Proventil inhaler  Proventil 2.5 mg/3 mL neb solution  Breo Ellipta inhaler  Symibort inhaler  Flonase nasal spray  Flovent Inhaler  Advair inhaler      Please write an appeals letter for us to send to the PA team so we can appeal the decision by insurance.    Routing to PCP to complete Appeals letter    Irina Villa MA

## 2024-02-16 NOTE — TELEPHONE ENCOUNTER
Drug exclusions can not be appealed. This medication will not be covered by the prescription plan for any reason. The drug is not on formulary and there are no loopholes to gaining approval.

## 2024-02-21 NOTE — TELEPHONE ENCOUNTER
Please let patient know that her insurance declined appeal; she might have to call the insurance directly

## 2024-02-22 NOTE — TELEPHONE ENCOUNTER
Called insurance they said no Appeal letter was sent to insurance and they need the Appeal letter and then can approve medication.    Routing message back to PA Team to complete Appeal process since it was not completed.    Irina Villa MA

## 2024-02-23 NOTE — TELEPHONE ENCOUNTER
Medication Appeal Initiation    Medication: VENTOLIN  (90 BASE) MCG/ACT IN AERS  Appeal Start Date:  2/23/2024  Insurance Company:   Insurance Phone:   Insurance Fax:   Comments:

## 2024-02-26 ENCOUNTER — TELEPHONE (OUTPATIENT)
Dept: FAMILY MEDICINE | Facility: CLINIC | Age: 49
End: 2024-02-26
Payer: COMMERCIAL

## 2024-02-26 NOTE — TELEPHONE ENCOUNTER
Note: Due to record-high volumes, our turn-around time is taking longer than usual . We are currently 10 business days behind in the pools.   We are working diligently to submit all requests in a timely manner and in the order they are received. Please only flag TRUE URGENT requests as high priority to the pool at this time.   If you have questions - please send a note/message in the active PA encounter and send back to the RPPA (Retail Pharmacy Prior Authorization) team [[id number]].    If you have more specific questions about our process please reach out to our supervisor Shira Louise.   Thank you!     We are currently submitting requests we received on 02/13/2024    MEDICATION APPEAL DENIED    Medication: VENTOLIN  (90 BASE) MCG/ACT IN [facility]  Insurance Company:   Denial Date: 2/23/2024  Denial Reason(s):         Second Level Appeal Information:       Patient Notified:   Central Prior Authorization Team ONLY: Second level appeals will be managed by the clinic staff and provider. Please contact the ealth Prior Authorization Team if additional information about the denial is needed.

## 2024-02-26 NOTE — TELEPHONE ENCOUNTER
Di VAZQUEZ  Team Red (supporting Amilcar Acosta MD)31 minutes ago (1:11 PM)       Jessa Acosta  I have contacted [facility] appeal center@ [phone]- per their request, if you could request my medical records from my visits with Dr. Brandy Joiner MD at the time she was located at [facility] as she originally prescribed Ventolin Inhaler for me and submit this to the appeal with the case# PA- [id number], I appreciate your assist with this process.    Thank you

## 2024-02-29 NOTE — TELEPHONE ENCOUNTER
Routing refill request to provider for review/approval because:  ACT    ACT Total Scores 11/27/2019 12/31/2019 6/2/2021   ACT TOTAL SCORE - - -   ASTHMA ER VISITS - - -   ASTHMA HOSPITALIZATIONS - - -   ACT TOTAL SCORE (Goal Greater than or Equal to 20) 17 20 16   In the past 12 months, how many times did you visit the emergency room for your asthma without being admitted to the hospital? 0 0 0   In the past 12 months, how many times were you hospitalized overnight because of your asthma? 0 0 0              Pending Prescriptions:                       Disp   Refills    BREO ELLIPTA 100-25 MCG/INH inhaler [Pharm*                Sig: INHALE 1 PUFF INTO THE LUNGS DAILY        Kit ERWIN Childs           [4+] : 4+ [Normal Gait and Station] : normal gait and station [Normal muscle strength, symmetry and tone of facial, head and neck musculature] : normal muscle strength, symmetry and tone of facial, head and neck musculature [Normal] : no cervical lymphadenopathy [Increased Work of Breathing] : no increased work of breathing with use of accessory muscles and retractions

## 2024-03-04 RX ORDER — ALBUTEROL SULFATE 90 UG/1
AEROSOL, METERED RESPIRATORY (INHALATION)
Qty: 18 G | Refills: 2 | Status: SHIPPED | OUTPATIENT
Start: 2024-03-04 | End: 2024-03-07

## 2024-03-06 ENCOUNTER — TELEPHONE (OUTPATIENT)
Dept: FAMILY MEDICINE | Facility: CLINIC | Age: 49
End: 2024-03-06
Payer: COMMERCIAL

## 2024-03-06 DIAGNOSIS — J45.40 MODERATE PERSISTENT ASTHMA WITHOUT COMPLICATION: Primary | ICD-10-CM

## 2024-03-06 NOTE — TELEPHONE ENCOUNTER
Patient was seen by Dr. Joiner on 6/17/2014.     Routing to PA team to please assist with providing records from [facility] to [facility], case# PA- [id number] .    SPRING LaraN RN  [facility]

## 2024-03-06 NOTE — LETTER
3/7/2024    INSURER: Payor: BCBS / Plan: [place] [place] / Product Type: Indemnity / [employer]  ATTN:   Re: Second Level Appeal reg: VENTOLIN  (90 Base) MCG/ACT inhaler  Patient: Di Higgins  Policy ID#:  [id number]  : 1975      To Whom it May Concern:    I am writing to formally request a prior authorization of coverage for my patient,  Di Higgins, for treatment using Ventolin HFA.  I am requesting authorization for applicable provider professional and facility services associated with this medication.     The benefits of the therapy include management of intermittent asthma.     I have treated Di Higgins since  and I have determined that it is medically appropriate for  this patient to receive be treated with Ventolin HFA for the reason(s) stated below:    Intermittent Asthma     Generic albuterol is not as effective for asthma control and causes side effects of sore throat     I have included medical records pertaining to the patient s medical history, current condition and treatment plan.      I firmly believe that this therapy is clinically appropriate and that Di Higgins would benefit from improved asthma control if allowed the opportunity to receive this treatment.  Please contact me at Dept: [phone] if you require additional information to ensure the prompt approval for coverage.    Please send your written decision to me at this address:  [address] [id number] [phone]  Dept: [phone]  E-mail:       Sincerely,      Darcy Woody MD       Sending appeal to:  [employer]   C/o    [address]  [place] [address]    Phone: [phone]  Fax: [phone]  Enclosures

## 2024-03-06 NOTE — TELEPHONE ENCOUNTER
General Call    Contacts         Type Contact Phone/Fax    03/06/2024 04:18 PM CST Phone (Incoming) Di Higgins (Self) [phone] (H)          Reason for Call:  patient needs a message to go to Dr. Acosta     Appeal on the medication for: VENTOLIN HFA/ needs name brand     The generic she needs more. It is not medically as effective as the name brand.    When she uses the Generic she gets a sore throat.    She is using the following medications for her asthma:  albuterol (PROAIR HFA/PROVENTIL HFA/VENTOLIN HFA) 108 (90 Base) MCG/ACT inhaler 18 g 2 3/4/2024 -- No   Sig: INHALE 2 PUFFS INTO THE LUNGS EVERY 6 HOURS     fluticasone-vilanterol (BREO ELLIPTA) 100-25 MCG/INH inhaler 60 each 5 5/27/2022 -- No   Sig - Route: Inhale 1 puff into the lungs daily - Inhalation     Moderate persistent asthma without complication  - Primary J45.40    Moderate persistent asthma, unspecified whether complicated J45.40      Chart note 11/3/2023, 5/27/2022, 1/25/2022 with Dr. Acosta  Telephone/ Refill encounter date 4/21/2022, nursing note 4/21/2022    She got an Appeal letter for her medication from the insurance, concerning her Lungs and Asthma     On the letter from the Insurance company got records as part of her appeal regarding her Brain Cyst, not for her lungs.    She would chart notes form Brandy Joiner MD Allergy & Immunology to be sent in support of her need for the medication.     Patient is low on her medications. She is concerned about Moderate persistent asthma, unspecified whether complicated [J45.40] She needs  the approval on the appeal for the VENTOLIN HFA/ needs name brand.    As allergy season is now, she really can't go without her medications.     Could we send this information to you in Emida or would you prefer to receive a phone call?:   Patient would prefer a phone call   Okay to leave a detailed message?: Yes at Cell number on file:    Telephone Information:   Mobile [phone]

## 2024-03-06 NOTE — TELEPHONE ENCOUNTER
See Prior Auth Medication encounter: 2/26/2024    Appeal letter in the chart written on 2/26/2024    Please review and advise the patient. She will be uploading the Letter on My Own Med.    Cecile Still,

## 2024-03-07 PROBLEM — J45.40 MODERATE PERSISTENT ASTHMA WITHOUT COMPLICATION: Status: RESOLVED | Noted: 2022-01-25 | Resolved: 2024-03-07

## 2024-03-07 PROBLEM — J45.20 INTERMITTENT ASTHMA WITHOUT COMPLICATION: Status: ACTIVE | Noted: 2024-03-07

## 2024-03-07 RX ORDER — ALBUTEROL SULFATE 90 UG/1
2 AEROSOL, METERED RESPIRATORY (INHALATION) EVERY 6 HOURS PRN
Qty: 18 G | Refills: 2 | Status: SHIPPED | OUTPATIENT
Start: 2024-03-07 | End: 2024-07-22

## 2024-03-07 NOTE — TELEPHONE ENCOUNTER
Signed Prescriptions:                        Disp   Refills    VENTOLIN  (90 Base) MCG/ACT inhaler 18 g   2        Sig: Inhale 2 puffs into the lungs every 6 hours as needed           for shortness of breath, wheezing or cough  Authorizing Provider: PARVEEN PERDOMO

## 2024-03-07 NOTE — TELEPHONE ENCOUNTER
Retail Pharmacy Prior Authorization Team   Phone: [phone]      I relayed information sent to the original PA was related to her asthma and lung based information, not brain related information.    I understand insurances states that we send incorrect information, and clinic feels that we need to send the second level if this our mistake.    I only see information sent in regards to her asthma.    We handle the original PA and first level appeal, after that, second levels are handled by the providers.    Insurance states we sent incorrect information, please see information that sent to the insurance for both the PA and First level appeal:    The PA was sent for the Brand and not a generic product.    Second Level Appeal information was provided and I can leave it here again as well:      The provider can also call to do a verbal second level appeal:          Information sent:            Letter sent with Appeal:

## 2024-03-07 NOTE — TELEPHONE ENCOUNTER
Appeal has already been denied, this will now need a second level appeal and that is handled by the clinic.  If you would like the appeal denial information, please provide a fax number or an email address and it will be sent to you.  Please see original telephone encounter from 02/12/2024.

## 2024-03-07 NOTE — TELEPHONE ENCOUNTER
Calling to the pharmacy to see if they have been able to run the new prescription.    They ran it through insurance and it was denied. The new prescription.    New Prior Authorization is needed.    Outpatient Medication Detail     Disp Refills Start End NACHO   VENTOLIN  (90 Base) MCG/ACT inhaler 18 g 2 3/7/2024 -- Yes   Sig - Route: Inhale 2 puffs into the lungs every 6 hours as needed for shortness of breath, wheezing or cough - Inhalation      A new Prior Authorization is needed on the new Medication/ Prescription written.    Will send a new Message to the Central PA Department.    Cecile Still,

## 2024-03-07 NOTE — TELEPHONE ENCOUNTER
[id number] - [facility], [place] - [address] AT [address]  [address] [facility] [address] [facility] [place] [address]  Phone: [phone] Fax: [phone]    Opens at 9:00 am      Called and updated Chyresse of the NACHO being sent over to the pharmacy.    VENTOLIN  (90 Base) MCG/ACT inhaler 18 g 2 3/7/2024 -- Yes   Sig - Route: Inhale 2 puffs into the lungs every 6 hours as needed for shortness of breath, wheezing or cough - Inhalation     Let her know, the team will reach out to her pharmacy after it is open to find out if it is covered and start a new PA if needed.    Cecile Still,

## 2024-03-07 NOTE — TELEPHONE ENCOUNTER
Called patient.  She asked that the Airsupra prescription be sent to the pharmacy so that they can run it through insurance and give her pricing.   She will do research on it first and if she feels comfortable, she will try the med    Steve Aguirre RN  [facility]

## 2024-03-07 NOTE — TELEPHONE ENCOUNTER
2nd or 3rd level appeal letter has been Written and it being sent.    Per cental team:    2nd level appeal information or if we could submit a new 1st level - Dao stated No, at this point a 3rd level appeal will need to be processed to their outside party  Case number: [id number]    To submit an urgent or standard external review, you may call or send documents to:   [employer] [facility]    [address] [place] [address]   Phone: [phone]   Fax: [phone]    More clarifying information from the central PA Team:         usually means that the patient will have to try/fail Airspuro in order to attempt to get the Ventolin approved    criteria and requirements some insurances have in order to cover a  plan exclusion  medication, is a multi-step requirement of things needed to be in-effective and the patient has to try/fail all preferred items, especially this insurance plan.     the medication will just be denied again because she has not tried/failed the Airsupra. Dao at Insurance stated yes, it looks like if there were no allergic reactions to a specific brand/generic that the Insurance preferred products will need to be tried/failed.  In-effectiveness  does not exclude all alternatives per insurance rules.     Airsupra is covered under her insurance (Dao stated there is a paid claim at the pharmacy) - she did not choose the tier exception option so we are unable to request one for this medication -  but the co-pay is higher, per Dao, this is due to a deductible she has to meet.     Option for lower cost of insurance preferred:  [email or web address]-card  here is the link and the PT will just have to register

## 2024-03-07 NOTE — TELEPHONE ENCOUNTER
Sent "[employer] message to let patient know that the prescription for Albuterol-Budesonide (Airsupra) inhaler has been sent    Steve Aguirre RN  [facility]

## 2024-03-07 NOTE — TELEPHONE ENCOUNTER
Talked with the central PA Team. A second Level Appeal will need to be done by the clinic.    Team was provided the phone number: [phone] Fax: [phone]    I was informed to Reference TE 2/12/2024.    [facility]   [facility]    [address]  [place] [address]    Phone: [phone]  Fax: [phone]    Written in the prior letter:   Di Higgins in seen for on moderate persistent asthma. She has been taking Ventolin inhaler for a long time; she has tried Proair and Proventil inhalers but did not help control her symptoms when was having asthma flare ups. I am thus appealing on her behalf, against the decision by the insurance not to provide her with the Ventolin inhaler, which has prevented the asthma flare up she used to experience frequently.     Medical Necessity Letter started under Communications    Will send to the covering provider to advise.    Cecile Still,

## 2024-04-15 ENCOUNTER — OFFICE VISIT (OUTPATIENT)
Dept: FAMILY MEDICINE | Facility: CLINIC | Age: 49
End: 2024-04-15
Payer: COMMERCIAL

## 2024-04-15 VITALS
HEART RATE: 87 BPM | TEMPERATURE: 98 F | HEIGHT: 62 IN | BODY MASS INDEX: 30.66 KG/M2 | WEIGHT: 166.6 LBS | OXYGEN SATURATION: 99 % | DIASTOLIC BLOOD PRESSURE: 85 MMHG | SYSTOLIC BLOOD PRESSURE: 134 MMHG | RESPIRATION RATE: 18 BRPM

## 2024-04-15 DIAGNOSIS — S62.605D: ICD-10-CM

## 2024-04-15 DIAGNOSIS — M54.2 NECK PAIN, BILATERAL: ICD-10-CM

## 2024-04-15 DIAGNOSIS — S62.656D CLOSED NONDISPLACED FRACTURE OF MIDDLE PHALANX OF RIGHT LITTLE FINGER WITH ROUTINE HEALING, SUBSEQUENT ENCOUNTER: ICD-10-CM

## 2024-04-15 DIAGNOSIS — T07.XXXA MULTIPLE BRUISES: ICD-10-CM
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
## 2024-04-15 DIAGNOSIS — M54.50 ACUTE LEFT-SIDED LOW BACK PAIN WITHOUT SCIATICA: ICD-10-CM

## 2024-04-15 PROCEDURE — 99214 OFFICE O/P EST MOD 30 MIN: CPT | Performed by: FAMILY MEDICINE

## 2024-04-15 ASSESSMENT — PAIN SCALES - GENERAL: PAINLEVEL: EXTREME PAIN (9)
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
## 2024-04-22 ENCOUNTER — TELEPHONE (OUTPATIENT)
Dept: FAMILY MEDICINE | Facility: CLINIC | Age: 49
End: 2024-04-22
Payer: COMMERCIAL
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
## 2024-04-22 NOTE — TELEPHONE ENCOUNTER
Will need a virtual visit to discuss the FMLA: she had hand surgery and would like to know what the surgeon (non [facility]) recommended prior to filling the forms.

## 2024-04-23 NOTE — TELEPHONE ENCOUNTER
Called and spoke with patient.    Let her know of the provider's message.    She understood.    She has a scheduled appointment on 4/24/2024 to go over the forms.    Cecile Still,

## 2024-04-24 ENCOUNTER — VIRTUAL VISIT (OUTPATIENT)
Dept: FAMILY MEDICINE | Facility: CLINIC | Age: 49
End: 2024-04-24
Payer: COMMERCIAL

## 2024-04-24 DIAGNOSIS — S62.605D CLOSED NONDISPLACED FRACTURE OF PHALANX OF LEFT RING FINGER WITH ROUTINE HEALING, UNSPECIFIED PHALANX, SUBSEQUENT ENCOUNTER: ICD-10-CM

## 2024-04-24 DIAGNOSIS — M25.511 ACUTE PAIN OF BOTH SHOULDERS: ICD-10-CM

## 2024-04-24 DIAGNOSIS — M25.512 ACUTE PAIN OF BOTH SHOULDERS: ICD-10-CM

## 2024-04-24 DIAGNOSIS — S62.606D CLOSED DISPLACED FRACTURE OF PHALANX OF RIGHT LITTLE FINGER WITH ROUTINE HEALING, UNSPECIFIED PHALANX, SUBSEQUENT ENCOUNTER: ICD-10-CM

## 2024-04-24 DIAGNOSIS — M54.6 ACUTE MIDLINE THORACIC BACK PAIN: ICD-10-CM

## 2024-04-24 DIAGNOSIS — M54.50 ACUTE BILATERAL LOW BACK PAIN WITHOUT SCIATICA: ICD-10-CM
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
## 2024-04-24 PROCEDURE — 99213 OFFICE O/P EST LOW 20 MIN: CPT | Mod: 95 | Performed by: FAMILY MEDICINE
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
## 2024-04-25 NOTE — TELEPHONE ENCOUNTER
Per provider, Patient no longer needed any forms completed.     She wanted a referral to [facility] [id number], [address]  [place] [address]  Phone: [phone]  Fax: [phone]    [email or web address]    Pending Confirmation of order. Cecile Still,

## 2024-04-29 ENCOUNTER — TELEPHONE (OUTPATIENT)
Dept: FAMILY MEDICINE | Facility: CLINIC | Age: 49
End: 2024-04-29
Payer: COMMERCIAL

## 2024-04-29 NOTE — TELEPHONE ENCOUNTER
Spoke to Di regarding the PA for VENTOLIN Inhaler. She states that she pays out of pocket for this inhaler. No need for a PA.  Yasmin Reyes CMA

## 2024-06-03 ENCOUNTER — OFFICE VISIT (OUTPATIENT)
Dept: FAMILY MEDICINE | Facility: CLINIC | Age: 49
End: 2024-06-03
Payer: COMMERCIAL

## 2024-06-03 VITALS
OXYGEN SATURATION: 99 % | RESPIRATION RATE: 20 BRPM | DIASTOLIC BLOOD PRESSURE: 89 MMHG | SYSTOLIC BLOOD PRESSURE: 124 MMHG | BODY MASS INDEX: 30 KG/M2 | HEIGHT: 62 IN | HEART RATE: 95 BPM | WEIGHT: 163 LBS | TEMPERATURE: 98 F

## 2024-06-03 DIAGNOSIS — I10 HYPERTENSION GOAL BP (BLOOD PRESSURE) < 140/90: ICD-10-CM

## 2024-06-03 DIAGNOSIS — J45.20 MILD INTERMITTENT ASTHMA WITHOUT COMPLICATION: ICD-10-CM

## 2024-06-03 DIAGNOSIS — R79.89 ELEVATED SERUM CREATININE: ICD-10-CM

## 2024-06-03 DIAGNOSIS — Z01.818 PREOP GENERAL PHYSICAL EXAM: Primary | ICD-10-CM

## 2024-06-03 LAB
ANION GAP SERPL CALCULATED.3IONS-SCNC: 12 MMOL/L (ref 7–15)
BUN SERPL-MCNC: 13.5 MG/DL (ref 6–20)
CALCIUM SERPL-MCNC: 10.4 MG/DL (ref 8.6–10)
CHLORIDE SERPL-SCNC: 104 MMOL/L (ref 98–107)
CREAT SERPL-MCNC: 1.19 MG/DL (ref 0.51–0.95)
DEPRECATED HCO3 PLAS-SCNC: 25 MMOL/L (ref 22–29)
EGFRCR SERPLBLD CKD-EPI 2021: 56 ML/MIN/1.73M2
GLUCOSE SERPL-MCNC: 105 MG/DL (ref 70–99)
POTASSIUM SERPL-SCNC: 4.1 MMOL/L (ref 3.4–5.3)
SODIUM SERPL-SCNC: 141 MMOL/L (ref 135–145)

## 2024-06-03 PROCEDURE — 80048 BASIC METABOLIC PNL TOTAL CA: CPT | Performed by: NURSE PRACTITIONER

## 2024-06-03 PROCEDURE — 36415 COLL VENOUS BLD VENIPUNCTURE: CPT | Performed by: NURSE PRACTITIONER

## 2024-06-03 PROCEDURE — 99214 OFFICE O/P EST MOD 30 MIN: CPT | Performed by: NURSE PRACTITIONER

## 2024-06-03 ASSESSMENT — ASTHMA QUESTIONNAIRES
QUESTION_2 LAST FOUR WEEKS HOW OFTEN HAVE YOU HAD SHORTNESS OF BREATH: ONCE OR TWICE A WEEK
ACT_TOTALSCORE: 19
QUESTION_3 LAST FOUR WEEKS HOW OFTEN DID YOUR ASTHMA SYMPTOMS (WHEEZING, COUGHING, SHORTNESS OF BREATH, CHEST TIGHTNESS OR PAIN) WAKE YOU UP AT NIGHT OR EARLIER THAN USUAL IN THE MORNING: ONCE A WEEK
QUESTION_5 LAST FOUR WEEKS HOW WOULD YOU RATE YOUR ASTHMA CONTROL: SOMEWHAT CONTROLLED
ACT_TOTALSCORE: 17
QUESTION_4 LAST FOUR WEEKS HOW OFTEN HAVE YOU USED YOUR RESCUE INHALER OR NEBULIZER MEDICATION (SUCH AS ALBUTEROL): TWO OR THREE TIMES PER WEEK
QUESTION_1 LAST FOUR WEEKS HOW MUCH OF THE TIME DID YOUR ASTHMA KEEP YOU FROM GETTING AS MUCH DONE AT WORK, SCHOOL OR AT HOME: A LITTLE OF THE TIME

## 2024-06-03 ASSESSMENT — PAIN SCALES - GENERAL: PAINLEVEL: NO PAIN (0)

## 2024-06-03 NOTE — PATIENT INSTRUCTIONS
Antiplatelet or Anticoagulation Medication Instructions   - Patient is on no antiplatelet or anticoagulation medications.    Additional Medication Instructions   - Diuretics: DO NOT TAKE on the day of surgery.   - ibuprofen (Advil, Motrin): DO NOT TAKE 1 day before surgery.    - rescue Inhaler: Continue PRN. Bring to hospital on the day of surgery.  - Continue Doxycycline Hyclate 100mg bid

## 2024-06-03 NOTE — LETTER
My Asthma Action Plan    Name: Di Higgins   YOB: 1975  Date: 6/3/2024   My doctor: JESUS Smyth CNP   My clinic: [facility]        My Rescue Medicine:   Albuterol inhaler (Proair/Ventolin/Proventil HFA)  2-4 puffs EVERY 4 HOURS as needed. Use a spacer if recommended by your provider.   My Asthma Severity:   Mild Persistent  Know your asthma triggers:  Nothing specific  Patient is unaware of triggers          GREEN ZONE   Good Control  I feel good  No cough or wheeze  Can work, sleep and play without asthma symptoms       Take your asthma control medicine every day.     If exercise triggers your asthma, take your rescue medication  15 minutes before exercise or sports, and  During exercise if you have asthma symptoms  Spacer to use with inhaler: If you have a spacer, make sure to use it with your inhaler             YELLOW ZONE Getting Worse  I have ANY of these:  I do not feel good  Cough or wheeze  Chest feels tight  Wake up at night   Keep taking your Green Zone medications  Start taking your rescue medicine:  every 20 minutes for up to 1 hour. Then every 4 hours for 24-48 hours.  If you stay in the Yellow Zone for more than 12-24 hours, contact your doctor.  If you do not return to the Green Zone in 12-24 hours or you get worse, start taking your oral steroid medicine if prescribed by your provider.           RED ZONE Medical Alert - Get Help  I have ANY of these:  I feel awful  Medicine is not helping  Breathing getting harder  Trouble walking or talking  Nose opens wide to breathe       Take your rescue medicine NOW  If your provider has prescribed an oral steroid medicine, start taking it NOW  Call your doctor NOW  If you are still in the Red Zone after 20 minutes and you have not reached your doctor:  Take your rescue medicine again and  Call 911 or go to the emergency room right away    See your regular doctor within 2 weeks of an Emergency Room or Urgent  Care visit for follow-up treatment.          Annual Reminders:  Meet with Asthma Educator,  Flu Shot in the Fall, consider Pneumonia Vaccination for patients with asthma (aged 19 and older).    Pharmacy:    [id number] - [place], [place] - [address] [facility] [address]  [id number] - [facility], [place] - [address] [facility] [address] [facility]  [id number] - [facility], [place] - [address] AT [address] & [facility]    Electronically signed by JESUS Smyth CNP   Date: 06/03/24                    Asthma Triggers  How To Control Things That Make Your Asthma Worse    Triggers are things that make your asthma worse.  Look at the list below to help you find your triggers and   what you can do about them. You can help prevent asthma flare-ups by staying away from your triggers.      Trigger                                                          What you can do   Cigarette Smoke  Tobacco smoke can make asthma worse. Do not allow smoking in your home, car or around you.  Be sure no one smokes at a child s day care or school.  If you smoke, ask your health care provider for ways to help you quit.  Ask family members to quit too.  Ask your health care provider for a referral to Quit Plan to help you quit smoking, or call 6-597-976-PLAN.     Colds, Flu, Bronchitis  These are common triggers of asthma. Wash your hands often.  Don t touch your eyes, nose or mouth.  Get a flu shot every year.     Dust Mites  These are tiny bugs that live in cloth or carpet. They are too small to see. Wash sheets and blankets in hot water every week.   Encase pillows and mattress in dust mite proof covers.  Avoid having carpet if you can. If you have carpet, vacuum weekly.   Use a dust mask and HEPA vacuum.   Pollen and Outdoor Mold  Some people are allergic to trees, grass, or weed pollen, or molds. Try to keep your windows closed.  Limit time out doors  when pollen count is high.   Ask you health care provider about taking medicine during allergy season.     Animal Dander  Some people are allergic to skin flakes, urine or saliva from pets with fur or feathers. Keep pets with fur or feathers out of your home.    If you can t keep the pet outdoors, then keep the pet out of your bedroom.  Keep the bedroom door closed.  Keep pets off cloth furniture and away from stuffed toys.     Mice, Rats, and Cockroaches  Some people are allergic to the waste from these pests.   Cover food and garbage.  Clean up spills and food crumbs.  Store grease in the refrigerator.   Keep food out of the bedroom.   Indoor Mold  This can be a trigger if your home has high moisture. Fix leaking faucets, pipes, or other sources of water.   Clean moldy surfaces.  Dehumidify basement if it is damp and smelly.   Smoke, Strong Odors, and Sprays  These can reduce air quality. Stay away from strong odors and sprays, such as perfume, powder, hair spray, paints, smoke incense, paint, cleaning products, candles and new carpet.   Exercise or Sports  Some people with asthma have this trigger. Be active!  Ask your doctor about taking medicine before sports or exercise to prevent symptoms.    Warm up for 5-10 minutes before and after sports or exercise.     Other Triggers of Asthma  Cold air:  Cover your nose and mouth with a scarf.  Sometimes laughing or crying can be a trigger.  Some medicines and food can trigger asthma.

## 2024-06-03 NOTE — PROGRESS NOTES
Pre[facility] Evaluation  [facility]  [address]  [place] [address]  Phone: [phone]  Primary Provider: Amilcar Acosta MD  Pre-op Performing Provider: JESUS Smyth CNP  Naun 3, 2024             6/3/2024   Surgical Information   What procedure is being done? hand surgery , REMOVING PIN FROM LEFT HAND   Facility or Hospital where procedure/surgery will be performed: [facility] [phone]   Who is doing the procedure / surgery?    Date of surgery / procedure: 06/07/24   Time of surgery / procedure: 5am   Where do you plan to recover after surgery? at home with family     Fax number for surgical facility: unknown    Assessment & Plan     The proposed surgical procedure is considered INTERMEDIATE risk.    (Z01.818) Preop general physical exam  (primary encounter diagnosis)  Comment: Presently Clinically Stable for Scheduled Surgery.   Plan: Basic metabolic panel  (Ca, Cl, CO2, Creat,         Gluc, K, Na, BUN)  - No identified additional risk factors other than previously addressed    Antiplatelet or Anticoagulation Medication Instructions   - Patient is on no antiplatelet or anticoagulation medications.    Additional Medication Instructions   - Diuretics: DO NOT TAKE on the day of surgery.   - ibuprofen (Advil, Motrin): DO NOT TAKE 1 day before surgery.    - rescue Inhaler: Continue PRN. Bring to hospital on the day of surgery.  - Continue Doxycycline Hyclate 100mg bid   -Avoidance of; Aspirin, Ibuprofen, Naproxen, and other NSAIDS 5-7 days prior to surgery.   -To call with any changes in present status  Recommendation  Approval given to proceed with proposed procedure, without further diagnostic evaluation.    (I10) Hypertension goal BP (blood pressure) < 140/90  Comment: stable  Plan: Basic metabolic panel  (Ca, Cl, CO2, Creat,         Gluc, K, Na, BUN)  -The current medical regimen is effective;  continue present plan and  Received from EMS for syncope/fall. Found on side laying position. Poor historian unable to recall how fall occurred. Patient reported took Norco and vodka. Patient reports right hip pain. Left leg externally rotated unable to extend. Patient denies neck pain and headache. Patient on blood thinners. Reports weakness. BFAST negative in field. Patient alert and oriented x3. Speech clear. Blood sugar WNL in field.    medications.     (R79.89) Elevated serum creatinine  Plan: Basic metabolic panel  (Ca, Cl, CO2, Creat,         Gluc, K, Na, BUN)    (J45.20) Mild intermittent asthma without complication  Comment: stable  PLAN:   -The current medical regimen is effective;  continue present plan and medications.      The longitudinal plan of care for the diagnosis(es)/condition(s) as documented were addressed during this visit. Due to the added complexity in care, I will continue to support Di in the subsequent management and with ongoing continuity of care.     Possible Sleep Apnea: No                   Subjective   Di is a 48 year old, presenting for the following:  Pre-Op Exam          6/3/2024     9:46 AM   Additional Questions   Roomed by Nikkie Lazaro MA   Accompanied by Self     HPI related to upcoming procedure:   Di Higgins is a 48-year-old female with significant medical history of asthma and hypertension who is scheduled for the removal of a K-wire from her left hand. She has had no complications from previous anesthesia and reports good functional status. She denies any chest pain, shortness of breath, or palpitations in the last six months.      Status of Chronic Conditions:  Asthma: Longstanding history of moderate-severe asthma. The patient reports no current symptoms and is well-controlled on her medication regimen, which includes an Albuterol inhaler as needed. She has not experienced any adverse reactions or side effects from her medications.  Hypertension (HTN): Longstanding history of hypertension. The patient denies any symptoms related to elevated blood pressure, including chest pain, palpitations, dyspnea, orthopnea, paroxysmal nocturnal dyspnea (PND), or peripheral edema. Blood pressure readings have been within the normal range. She denies any side effects from her current medication regimen.   Renal Function: The patient had an elevated creatinine level last year with a glomerular  filtration rate (GFR) of 63.          6/3/2024   Pre-Op Questionnaire   Have you ever had a heart attack or stroke? No   Have you ever had surgery on your heart or blood vessels, such as a stent placement, a coronary artery bypass, or surgery on an artery in your head, neck, heart, or legs? No   Do you have chest pain with activity? No   Do you have a history of heart failure? No   Do you currently have a cold, bronchitis or symptoms of other infection? No   Do you have a cough, shortness of breath, or wheezing? No   Do you or anyone in your family have previous history of blood clots? No   Do you or does anyone in your family have a serious bleeding problem such as prolonged bleeding following surgeries or cuts? No   Have you ever had problems with anemia or been told to take iron pills? No   Have you had any abnormal blood loss such as black, tarry or bloody stools, or abnormal vaginal bleeding? No   Have you ever had a blood transfusion? No   Are you willing to have a blood transfusion if it is medically needed before, during, or after your surgery? Yes   Have you or any of your relatives ever had problems with anesthesia? No   Do you have sleep apnea, excessive snoring or daytime drowsiness? No   Do you have any artifical heart valves or other implanted medical devices like a pacemaker, defibrillator, or continuous glucose monitor? No   Do you have artificial joints? No   Are you allergic to latex? No     Health Care Directive  Patient does not have a Health Care Directive or Living Will: Patient states has Advance Directive and will bring in a copy to clinic.    Preoperative Review of    reviewed - no record of controlled substances prescribed.      Status of Chronic Conditions:  ASTHMA - Patient has a longstanding history of moderate-severe Asthma . Patient has been doing well overall noting NO SYMPTOMS and continues on medication regimen consisting of Albuterol inhaler as needed without adverse  reactions or side effects.     HYPERTENSION - Patient has longstanding history of HTN , currently denies any symptoms referable to elevated blood pressure. Specifically denies chest pain, palpitations, dyspnea, orthopnea, PND or peripheral edema. Blood pressure readings have been in normal range. Current medication regimen is as listed below. Patient denies any side effects of medication.     Patient had elevated creatinine last years and  GFR of 63.  Patient Active Problem List    Diagnosis Date Noted    Elevated serum creatinine 06/03/2024     Priority: Medium    Hypertension goal BP (blood pressure) < 140/90 06/03/2024     Priority: Medium    Intermittent asthma without complication 03/07/2024     Priority: Medium    Nonintractable paroxysmal hemicrania 09/13/2016     Priority: Medium    GERD (gastroesophageal reflux disease)      Priority: Medium    Seasonal allergic rhinitis      Priority: Medium    Allergic rhinitis due to animal dander      Priority: Medium    House dust mite allergy      Priority: Medium    Vitamin D deficiency      Priority: Medium      Past Medical History:   Diagnosis Date    Allergic rhinitis due to animal dander     Diagnostic skin and sensitization tests 4/11 IgE tests pos. for:  pos. for: cat/dog(+)/DM/M/T/RW    tests per [facility]    GERD (gastroesophageal reflux disease)     4/11 visit, GERD with epigastric pain--Neg. H. Pylori    House dust mite allergy     Moderate persistent asthma 4/11 UsC=428    at new pt visit of 4/11, pt admited no controller meds x 1 yr.    Rhinitis, allergic to other allergen     Seasonal allergic rhinitis     4/11 IgE tests pos. for: cat/dog(+)/DM/M/T/RW    Vitamin D deficiency 4/11 noted--started on supplement     Past Surgical History:   Procedure Laterality Date    COLONOSCOPY N/A 7/14/2023    Procedure: COLONOSCOPY, FLEXIBLE, WITH LESION REMOVAL USING SNARE;  Surgeon: Debby Delatorre DO;  Location: [facility] OR    COLONOSCOPY WITH CO2 INSUFFLATION N/A 7/14/2023     Procedure: Colonoscopy with CO2 insufflation;  Surgeon: Debyb Delatorre DO;  Location: [facility] OR    HYSTERECTOMY, PAP NO LONGER INDICATED  12/09/2016    HYSTERECTOMY, VAGINAL  12/09/2016    uterine leiomyoma, adenomyosis    TUBAL LIGATION  1999    laparoscopic tubal     Current Outpatient Medications   Medication Sig Dispense Refill    albuterol (PROVENTIL) (2.5 MG/3ML) 0.083% neb solution USE 1 VIAL VIA NEBULIZER EVERY 6 HOURS AS NEEDED FOR SHORTNESS OF BREATH OR DIFFICULT BREATHING OR WHEEZING 75 mL 1    doxycycline hyclate (PERIOSTAT) 20 MG tablet TAKE 1 TABLET BY MOUTH TWICE DAILY FOR ACNE      hydrochlorothiazide (HYDRODIURIL) 12.5 MG tablet TAKE 1 TABLET(12.5 MG) BY MOUTH DAILY 90 tablet 1    VENTOLIN  (90 Base) MCG/ACT inhaler Inhale 2 puffs into the lungs every 6 hours as needed for shortness of breath, wheezing or cough 18 g 2    Albuterol-Budesonide 90-80 MCG/ACT AERO Inhale 2 puffs into the lungs 2 times daily as needed (cough, wheezing, or shortness of breath) 10.7 g 4       Allergies   Allergen Reactions    Penicillin V         Social History     Tobacco Use    Smoking status: Former     Passive exposure: Never    Smokeless tobacco: Former     Quit date: 1/2/1985   Substance Use Topics    Alcohol use: Yes     Comment: occasional       History   Drug Use No             Review of Systems  CONSTITUTIONAL: NEGATIVE for fever, chills, change in weight  INTEGUMENTARY/SKIN: NEGATIVE for worrisome rashes, moles or lesions  EYES: NEGATIVE for vision changes or irritation  ENT/MOUTH: NEGATIVE for ear, mouth and throat problems  RESP: NEGATIVE for significant cough or SOB  BREAST: NEGATIVE for masses, tenderness or discharge  CV: NEGATIVE for chest pain, palpitations or peripheral edema  GI: NEGATIVE for nausea, abdominal pain, heartburn, or change in bowel habits  : NEGATIVE for frequency, dysuria, or hematuria  MUSCULOSKELETAL: pain on  right pink finger and left ring finger   NEURO: NEGATIVE for  "weakness, dizziness or paresthesias  ENDOCRINE: NEGATIVE for temperature intolerance, skin/hair changes  HEME: NEGATIVE for bleeding problems  PSYCHIATRIC: NEGATIVE for changes in mood or affect      Objective    /89 (BP Location: Left arm, Patient Position: Chair, Cuff Size: Adult Regular)   Pulse 95   Temp 98  F (36.7  C) (Oral)   Resp 20   Ht 1.575 m (5' 2\")   Wt 73.9 kg (163 lb)   LMP 11/10/2016 (Exact Date)   SpO2 99%   BMI 29.81 kg/m     Estimated body mass index is 29.81 kg/m  as calculated from the following:    Height as of this encounter: 1.575 m (5' 2\").    Weight as of this encounter: 73.9 kg (163 lb).  Physical Exam  GENERAL: alert and no distress  EYES: Eyes grossly normal to inspection, PERRL and conjunctivae and sclerae normal  HENT: ear canals and TM's normal, nose and mouth without ulcers or lesions  NECK: no adenopathy, no asymmetry, masses, or scars  RESP: lungs clear to auscultation - no rales, rhonchi or wheezes  CV: regular rate and rhythm, normal S1 S2, no S3 or S4, no murmur, click or rub, no peripheral edema  ABDOMEN: soft, nontender, no hepatosplenomegaly, no masses and bowel sounds normal  MS: right pink finger and left ring finger tenderness  SKIN: no suspicious lesions or rashes  NEURO: Normal strength and tone, mentation intact and speech normal  PSYCH: mentation appears normal, affect normal/bright    Recent Labs   Lab Test 11/03/23  1451      POTASSIUM 3.7   CR 1.07*        Diagnostics  Labs pending at this time.  Results will be reviewed when available.   No EKG required, no history of coronary heart disease, significant arrhythmia, peripheral arterial disease or other structural heart disease.    Revised Cardiac Risk Index (RCRI)  The patient has the following serious cardiovascular risks for perioperative complications:   - No serious cardiac risks = 0 points     RCRI Interpretation: 1 point: Class II (low risk - 0.9% complication rate)         Signed " Electronically by: JESUS Smyth CNP  Copy of this evaluation report is provided to requesting physician.     Reviewed Note only.Patient not seen by me.  Jeannie Roche MD

## 2024-06-05 ENCOUNTER — TELEPHONE (OUTPATIENT)
Dept: FAMILY MEDICINE | Facility: CLINIC | Age: 49
End: 2024-06-05
Payer: COMMERCIAL

## 2024-06-05 NOTE — TELEPHONE ENCOUNTER
"Called patient. Left voice message to return call at [phone].    When patient returns call, please inform of provider's message regarding results:  \"Ms. Higgins,     Your kidney function was slightly abnormal and you CKD stage 3.  I encourage you to drink plenty of fluids and we can recheck this in 2 weeks.  This can be caused by medications and some underlying medical conditions.  It is important that we continue to monitor and control your blood pressure well in order to preserve as much kidney function as possible.  This should be rechecked every 3 months.     Please contact the clinic if you have additional questions.  Thank you.     Sincerely,     JESUS Smyth CNP  \"    AURELIA Lara RN  [facility]  "

## 2024-06-10 ENCOUNTER — DOCUMENTATION ONLY (OUTPATIENT)
Dept: FAMILY MEDICINE | Facility: CLINIC | Age: 49
End: 2024-06-10
Payer: COMMERCIAL

## 2024-06-10 NOTE — PROGRESS NOTES
Heaudie SAL Higgins has an upcoming lab appointment:    Future Appointments   Date Time Provider Department Center   6/24/2024  8:15 AM [facility]     Patient is scheduled for the following lab(s):     There is no order available. Please review and place either future orders or HMPO (Review of Health Maintenance Protocol Orders), as appropriate.    Health Maintenance Due   Topic    ANNUAL REVIEW OF HM ORDERS      Zeb Coffman

## 2024-06-10 NOTE — TELEPHONE ENCOUNTER
"Spoke with patient and gave ocp message:    \"Ms. Higgins,     Your kidney function was slightly abnormal and you CKD stage 3.  I encourage you to drink plenty of fluids and we can recheck this in 2 weeks.  This can be caused by medications and some underlying medical conditions.  It is important that we continue to monitor and control your blood pressure well in order to preserve as much kidney function as possible.  This should be rechecked every 3 months.     Please contact the clinic if you have additional questions.  Thank you.     Sincerely,     JESUS Smyth CNP\"    Wood for lab appointment 6/24/24    Dinah Pittman RN  [facility]  '  "

## 2024-06-10 NOTE — TELEPHONE ENCOUNTER
We need to basic metabolic panel after 3 months. I spoke to her so please cancel her lab appt. Pt knows about this

## 2024-06-17 ENCOUNTER — TELEPHONE (OUTPATIENT)
Dept: FAMILY MEDICINE | Facility: CLINIC | Age: 49
End: 2024-06-17
Payer: COMMERCIAL

## 2024-06-17 NOTE — TELEPHONE ENCOUNTER
Patient Quality Outreach    Patient is due for the following:   Cervical Cancer Screening - PAP Needed  Physical Preventive Adult Physical    Next Steps:   Schedule a Adult Preventative    Type of outreach:    Sent [employer] message.      Questions for provider review:    None           BIANCA ABREU, CMA

## 2024-06-19 ENCOUNTER — DOCUMENTATION ONLY (OUTPATIENT)
Dept: FAMILY MEDICINE | Facility: CLINIC | Age: 49
End: 2024-06-19
Payer: COMMERCIAL

## 2024-07-21 DIAGNOSIS — J45.40 MODERATE PERSISTENT ASTHMA WITHOUT COMPLICATION: ICD-10-CM

## 2024-07-22 RX ORDER — ALBUTEROL SULFATE 90 UG/1
AEROSOL, METERED RESPIRATORY (INHALATION)
Qty: 18 G | Refills: 5 | Status: SHIPPED | OUTPATIENT
Start: 2024-07-22

## 2024-08-03 ENCOUNTER — HEALTH MAINTENANCE LETTER (OUTPATIENT)
Age: 49
End: 2024-08-03

## 2024-09-23 ENCOUNTER — E-VISIT (OUTPATIENT)
Dept: FAMILY MEDICINE | Facility: CLINIC | Age: 49
End: 2024-09-23
Payer: COMMERCIAL

## 2024-09-23 ENCOUNTER — MYC MEDICAL ADVICE (OUTPATIENT)
Dept: FAMILY MEDICINE | Facility: CLINIC | Age: 49
End: 2024-09-23
Payer: COMMERCIAL

## 2024-09-23 DIAGNOSIS — H00.013 HORDEOLUM EXTERNUM OF RIGHT EYE, UNSPECIFIED EYELID: Primary | ICD-10-CM

## 2024-09-23 PROCEDURE — 99421 OL DIG E/M SVC 5-10 MIN: CPT | Performed by: FAMILY MEDICINE

## 2024-09-23 NOTE — PATIENT INSTRUCTIONS
Thank you for choosing us for your care. I have placed the below referral(s) for you:  Orders Placed This Encounter   Procedures     Eye  Referral, Adult       Please click the link for your After Visit Summary to view scheduling instructions for your referral. In most cases, you will be contacted within 2 business days to schedule. If you do not hear from a representative within that time, please call [phone] to be connected to a .

## 2024-10-07 ENCOUNTER — TELEPHONE (OUTPATIENT)
Dept: OPHTHALMOLOGY | Facility: CLINIC | Age: 49
End: 2024-10-07
Payer: COMMERCIAL
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
## 2024-10-07 NOTE — TELEPHONE ENCOUNTER
Returned call to patient.     Patient reports she has had a stye in her R eye, inner lower eyelid x 2 week. Reports she has been using warm compress. Reports it has slightly gone down but not fully gone away.     Patient also reports she is seeing a new constant black dot/floater in her left eye as of this morning.     Patient denies any other vision changes, loss of vision, blurred vision, pain, light sensitivity. Patient denies drainage.     Priority 1-2 week referral from family medicine.     Patient declined scheduling in [place] and would like to be seen in [place] or [place].     Will route message to our community team.     Sonya Unger RN 2:58 PM 10/07/24

## 2024-10-07 NOTE — TELEPHONE ENCOUNTER
Health Call Center    Phone Message    May a detailed message be left on voicemail: yes     Reason for Call: Appointment Intake    Referring Provider Name: Darcy Woody, MDDiagnosis and/or Symptoms: Hordeolum externum of right eye, unspecified.    Patient also states she noticed a floater in her left eye this morning.  Please call.  Thank you.      Action Taken: Message routed to:  [facility] Ophthalmology    Travel Screening: Not Applicable     Date of Service:

## 2024-10-10 ENCOUNTER — OFFICE VISIT (OUTPATIENT)
Dept: OPHTHALMOLOGY | Facility: CLINIC | Age: 49
End: 2024-10-10
Payer: COMMERCIAL

## 2024-10-10 DIAGNOSIS — H00.12 CHALAZION OF RIGHT LOWER EYELID: Primary | ICD-10-CM

## 2024-10-10 DIAGNOSIS — H43.393 VITREOUS SYNERESIS OF BOTH EYES: ICD-10-CM

## 2024-10-10 PROCEDURE — 92002 INTRM OPH EXAM NEW PATIENT: CPT | Performed by: OPHTHALMOLOGY

## 2024-10-10 ASSESSMENT — VISUAL ACUITY
OD_CC: 20/20
OS_CC: 20/20
OD_CC+: -2
CORRECTION_TYPE: GLASSES
METHOD: SNELLEN - LINEAR

## 2024-10-10 ASSESSMENT — CONF VISUAL FIELD
OD_NORMAL: 1
OS_INFERIOR_TEMPORAL_RESTRICTION: 0
OD_INFERIOR_TEMPORAL_RESTRICTION: 0
OD_SUPERIOR_NASAL_RESTRICTION: 0
OS_INFERIOR_NASAL_RESTRICTION: 0
OD_SUPERIOR_TEMPORAL_RESTRICTION: 0
OD_INFERIOR_NASAL_RESTRICTION: 0
OS_SUPERIOR_TEMPORAL_RESTRICTION: 0
OS_NORMAL: 1
OS_SUPERIOR_NASAL_RESTRICTION: 0

## 2024-10-10 ASSESSMENT — TONOMETRY
IOP_METHOD: ICARE
OS_IOP_MMHG: 10
OD_IOP_MMHG: 10

## 2024-10-10 ASSESSMENT — REFRACTION_WEARINGRX
OS_CYLINDER: SPHERE
SPECS_TYPE: SVL
OD_CYLINDER: +0.50
OD_AXIS: 016
OS_SPHERE: -1.00
OD_SPHERE: -1.50
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
## 2024-10-10 NOTE — PATIENT INSTRUCTIONS
"Glasses prescription given - optional    May use artificial tears up to four times a day (like Refresh Optive, Systane Balance, TheraTears, or generic artificial tears are ok. Avoid \"get the red out\" drops).    Warm pack to right lower lid for 10 minutes 2-3 times daily.     Call in June 2025 for an appointment in October 2025 for Complete Exam    Dr. Avelar [phone]    "
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
## 2024-10-11 PROBLEM — H00.12 CHALAZION OF RIGHT LOWER EYELID: Status: ACTIVE | Noted: 2024-10-11

## 2024-10-11 PROBLEM — H43.393 VITREOUS SYNERESIS OF BOTH EYES: Status: ACTIVE | Noted: 2024-10-11

## 2024-10-11 ASSESSMENT — SLIT LAMP EXAM - LIDS: COMMENTS: 1+ PTOSIS

## 2024-10-11 ASSESSMENT — CUP TO DISC RATIO
OS_RATIO: 0.5
OD_RATIO: 0.45

## 2024-10-11 ASSESSMENT — EXTERNAL EXAM - RIGHT EYE: OD_EXAM: NORMAL

## 2024-10-11 ASSESSMENT — EXTERNAL EXAM - LEFT EYE: OS_EXAM: NORMAL

## 2024-11-20 ENCOUNTER — MYC MEDICAL ADVICE (OUTPATIENT)
Dept: FAMILY MEDICINE | Facility: CLINIC | Age: 49
End: 2024-11-20
Payer: COMMERCIAL

## 2024-12-10 ENCOUNTER — TELEPHONE (OUTPATIENT)
Dept: FAMILY MEDICINE | Facility: CLINIC | Age: 49
End: 2024-12-10
Payer: COMMERCIAL

## 2024-12-10 NOTE — TELEPHONE ENCOUNTER
Patient Quality Outreach    Patient is due for the following:   Cervical Cancer Screening - PAP Needed  Physical Preventive Adult Physical    Action(s) Taken:   Schedule a Adult Preventative    Type of outreach:    Sent [employer] message.    Questions for provider review:    None           BIANCA ABREU, CMA

## 2025-01-24 ENCOUNTER — OFFICE VISIT (OUTPATIENT)
Dept: FAMILY MEDICINE | Facility: CLINIC | Age: 50
End: 2025-01-24
Payer: COMMERCIAL

## 2025-01-24 VITALS
HEIGHT: 63 IN | DIASTOLIC BLOOD PRESSURE: 84 MMHG | BODY MASS INDEX: 26.97 KG/M2 | HEART RATE: 82 BPM | OXYGEN SATURATION: 98 % | SYSTOLIC BLOOD PRESSURE: 129 MMHG | TEMPERATURE: 97.8 F | WEIGHT: 152.2 LBS | RESPIRATION RATE: 17 BRPM

## 2025-01-24 DIAGNOSIS — Z13.6 CARDIOVASCULAR SCREENING; LDL GOAL LESS THAN 130: ICD-10-CM

## 2025-01-24 DIAGNOSIS — R35.0 URINARY FREQUENCY: ICD-10-CM

## 2025-01-24 DIAGNOSIS — I10 HTN, GOAL BELOW 140/90: ICD-10-CM

## 2025-01-24 DIAGNOSIS — J45.40 MODERATE PERSISTENT ASTHMA WITHOUT COMPLICATION: Primary | ICD-10-CM

## 2025-01-24 DIAGNOSIS — Z00.00 HEALTHCARE MAINTENANCE: ICD-10-CM

## 2025-01-24 DIAGNOSIS — E78.5 HYPERLIPIDEMIA LDL GOAL <100: ICD-10-CM

## 2025-01-24 LAB
ALBUMIN SERPL BCG-MCNC: 4.2 G/DL (ref 3.5–5.2)
ALBUMIN UR-MCNC: NEGATIVE MG/DL
ALP SERPL-CCNC: 87 U/L (ref 40–150)
ALT SERPL W P-5'-P-CCNC: 32 U/L (ref 0–50)
ANION GAP SERPL CALCULATED.3IONS-SCNC: 11 MMOL/L (ref 7–15)
APPEARANCE UR: CLEAR
AST SERPL W P-5'-P-CCNC: 23 U/L (ref 0–45)
BASOPHILS # BLD AUTO: 0 10E3/UL (ref 0–0.2)
BASOPHILS NFR BLD AUTO: 0 %
BILIRUB SERPL-MCNC: 0.4 MG/DL
BILIRUB UR QL STRIP: NEGATIVE
BUN SERPL-MCNC: 11.3 MG/DL (ref 6–20)
C TRACH DNA SPEC QL NAA+PROBE: NEGATIVE
CALCIUM SERPL-MCNC: 10 MG/DL (ref 8.8–10.4)
CHLORIDE SERPL-SCNC: 106 MMOL/L (ref 98–107)
CHOLEST SERPL-MCNC: 230 MG/DL
COLOR UR AUTO: YELLOW
CREAT SERPL-MCNC: 1.17 MG/DL (ref 0.51–0.95)
EGFRCR SERPLBLD CKD-EPI 2021: 57 ML/MIN/1.73M2
EOSINOPHIL # BLD AUTO: 0.3 10E3/UL (ref 0–0.7)
EOSINOPHIL NFR BLD AUTO: 3 %
ERYTHROCYTE [DISTWIDTH] IN BLOOD BY AUTOMATED COUNT: 13.1 % (ref 10–15)
FASTING STATUS PATIENT QL REPORTED: NO
FASTING STATUS PATIENT QL REPORTED: NO
GLUCOSE SERPL-MCNC: 121 MG/DL (ref 70–99)
GLUCOSE UR STRIP-MCNC: NEGATIVE MG/DL
HCO3 SERPL-SCNC: 25 MMOL/L (ref 22–29)
HCT VFR BLD AUTO: 40.8 % (ref 35–47)
HDLC SERPL-MCNC: 41 MG/DL
HGB BLD-MCNC: 13.3 G/DL (ref 11.7–15.7)
HGB UR QL STRIP: NEGATIVE
HIV 1+2 AB+HIV1 P24 AG SERPL QL IA: NONREACTIVE
IMM GRANULOCYTES # BLD: 0 10E3/UL
IMM GRANULOCYTES NFR BLD: 0 %
KETONES UR STRIP-MCNC: NEGATIVE MG/DL
LDLC SERPL CALC-MCNC: 150 MG/DL
LEUKOCYTE ESTERASE UR QL STRIP: NEGATIVE
LYMPHOCYTES # BLD AUTO: 3.2 10E3/UL (ref 0.8–5.3)
LYMPHOCYTES NFR BLD AUTO: 41 %
MCH RBC QN AUTO: 29.5 PG (ref 26.5–33)
MCHC RBC AUTO-ENTMCNC: 32.6 G/DL (ref 31.5–36.5)
MCV RBC AUTO: 91 FL (ref 78–100)
MONOCYTES # BLD AUTO: 0.5 10E3/UL (ref 0–1.3)
MONOCYTES NFR BLD AUTO: 7 %
N GONORRHOEA DNA SPEC QL NAA+PROBE: NEGATIVE
NEUTROPHILS # BLD AUTO: 3.9 10E3/UL (ref 1.6–8.3)
NEUTROPHILS NFR BLD AUTO: 49 %
NITRATE UR QL: NEGATIVE
NONHDLC SERPL-MCNC: 189 MG/DL
PH UR STRIP: 5.5 [PH] (ref 5–7)
PLATELET # BLD AUTO: 295 10E3/UL (ref 150–450)
POTASSIUM SERPL-SCNC: 3.7 MMOL/L (ref 3.4–5.3)
PROT SERPL-MCNC: 7.3 G/DL (ref 6.4–8.3)
RBC # BLD AUTO: 4.51 10E6/UL (ref 3.8–5.2)
SODIUM SERPL-SCNC: 142 MMOL/L (ref 135–145)
SP GR UR STRIP: >=1.03 (ref 1–1.03)
SPECIMEN TYPE: NORMAL
SPECIMEN TYPE: NORMAL
T PALLIDUM AB SER QL: NONREACTIVE
TRIGL SERPL-MCNC: 193 MG/DL
UROBILINOGEN UR STRIP-ACNC: 0.2 E.U./DL
WBC # BLD AUTO: 8 10E3/UL (ref 4–11)

## 2025-01-24 PROCEDURE — 90677 PCV20 VACCINE IM: CPT | Performed by: FAMILY MEDICINE

## 2025-01-24 PROCEDURE — 87591 N.GONORRHOEAE DNA AMP PROB: CPT | Performed by: FAMILY MEDICINE

## 2025-01-24 PROCEDURE — 87491 CHLMYD TRACH DNA AMP PROBE: CPT | Performed by: FAMILY MEDICINE

## 2025-01-24 PROCEDURE — 80053 COMPREHEN METABOLIC PANEL: CPT | Performed by: FAMILY MEDICINE

## 2025-01-24 PROCEDURE — 87389 HIV-1 AG W/HIV-1&-2 AB AG IA: CPT | Performed by: FAMILY MEDICINE

## 2025-01-24 PROCEDURE — 99214 OFFICE O/P EST MOD 30 MIN: CPT | Mod: 25 | Performed by: FAMILY MEDICINE

## 2025-01-24 PROCEDURE — 90750 HZV VACC RECOMBINANT IM: CPT | Performed by: FAMILY MEDICINE

## 2025-01-24 PROCEDURE — 90472 IMMUNIZATION ADMIN EACH ADD: CPT | Performed by: FAMILY MEDICINE

## 2025-01-24 PROCEDURE — 85025 COMPLETE CBC W/AUTO DIFF WBC: CPT | Performed by: FAMILY MEDICINE

## 2025-01-24 PROCEDURE — 80061 LIPID PANEL: CPT | Performed by: FAMILY MEDICINE

## 2025-01-24 PROCEDURE — 90471 IMMUNIZATION ADMIN: CPT | Performed by: FAMILY MEDICINE

## 2025-01-24 PROCEDURE — 36415 COLL VENOUS BLD VENIPUNCTURE: CPT | Performed by: FAMILY MEDICINE

## 2025-01-24 PROCEDURE — 86780 TREPONEMA PALLIDUM: CPT | Performed by: FAMILY MEDICINE

## 2025-01-24 PROCEDURE — 81003 URINALYSIS AUTO W/O SCOPE: CPT | Performed by: FAMILY MEDICINE

## 2025-01-24 RX ORDER — ALBUTEROL SULFATE 0.83 MG/ML
2.5 SOLUTION RESPIRATORY (INHALATION) EVERY 6 HOURS PRN
Qty: 75 ML | Refills: 1 | Status: SHIPPED | OUTPATIENT
Start: 2025-01-24

## 2025-01-24 RX ORDER — ALBUTEROL SULFATE 90 UG/1
2 AEROSOL, METERED RESPIRATORY (INHALATION) EVERY 6 HOURS PRN
Qty: 18 G | Refills: 5 | Status: SHIPPED | OUTPATIENT
Start: 2025-01-24

## 2025-01-24 RX ORDER — AMLODIPINE BESYLATE 5 MG/1
5 TABLET ORAL DAILY
Qty: 90 TABLET | Refills: 1 | Status: SHIPPED | OUTPATIENT
Start: 2025-01-24

## 2025-01-24 ASSESSMENT — ASTHMA QUESTIONNAIRES
ACT_TOTALSCORE: 17
QUESTION_2 LAST FOUR WEEKS HOW OFTEN HAVE YOU HAD SHORTNESS OF BREATH: ONCE A DAY
ACT_TOTALSCORE: 17
QUESTION_1 LAST FOUR WEEKS HOW MUCH OF THE TIME DID YOUR ASTHMA KEEP YOU FROM GETTING AS MUCH DONE AT WORK, SCHOOL OR AT HOME: NONE OF THE TIME
QUESTION_3 LAST FOUR WEEKS HOW OFTEN DID YOUR ASTHMA SYMPTOMS (WHEEZING, COUGHING, SHORTNESS OF BREATH, CHEST TIGHTNESS OR PAIN) WAKE YOU UP AT NIGHT OR EARLIER THAN USUAL IN THE MORNING: ONCE OR TWICE
QUESTION_4 LAST FOUR WEEKS HOW OFTEN HAVE YOU USED YOUR RESCUE INHALER OR NEBULIZER MEDICATION (SUCH AS ALBUTEROL): TWO OR THREE TIMES PER WEEK
QUESTION_5 LAST FOUR WEEKS HOW WOULD YOU RATE YOUR ASTHMA CONTROL: SOMEWHAT CONTROLLED

## 2025-01-24 ASSESSMENT — PAIN SCALES - GENERAL: PAINLEVEL_OUTOF10: MILD PAIN (3)

## 2025-01-24 NOTE — PROGRESS NOTES
Assessment & Plan     Moderate persistent asthma without complication   Asthma fairly well controlled, ACT score 17, doing only Albuterol as needed, previously had a taken a controller medication; if persist will consider restarting medication; though patient states flare ups are not frequent  - VENTOLIN  (90 Base) MCG/ACT inhaler  Dispense: 18 g; Refill: 5    HTN, goal below 140/90   BP well controlled, but concerned about increasing frequency of urination, krystal switch from hydrochlorothiazide to Amlodipine.    - Comprehensive metabolic panel (BMP + Alb, Alk Phos, ALT, AST, Total. Bili, TP)  - amLODIPine (NORVASC) 5 MG tablet  Dispense: 90 tablet; Refill: 1  - Comprehensive metabolic panel (BMP + Alb, Alk Phos, ALT, AST, Total. Bili, TP)    Hyperlipidemia LDL goal <100   Cholesterol levels above desirable, the 10-year ASCVD risk score (Tiki WORTHINGTON, et al., 2019) is: 3%, discussed healthy diet and regular exercise    Healthcare maintenance   Due for physical, will schedule check up  - CBC with platelets and differential  - HIV Antigen Antibody Combo  - Treponema Abs w Reflex to RPR and Titer  - UA Macroscopic with reflex to Microscopic and Culture - Lab Collect  - NEISSERIA GONORRHOEA PCR  - CHLAMYDIA TRACHOMATIS PCR  - CBC with platelets and differential  - HIV Antigen Antibody Combo  - Treponema Abs w Reflex to RPR and Titer  - UA Macroscopic with reflex to Microscopic and Culture - Lab Collect  - NEISSERIA GONORRHOEA PCR  - CHLAMYDIA TRACHOMATIS PCR    Moderate persistent asthma with exacerbation   Does need albuterol nebs when symptoms flare up  - albuterol (PROVENTIL) (2.5 MG/3ML) 0.083% neb solution  Dispense: 75 mL; Refill: 1    CARDIOVASCULAR SCREENING; LDL GOAL LESS THAN 130  - Lipid Profile (Chol, Trig, HDL, LDL calc)  - Lipid Profile (Chol, Trig, HDL, LDL calc)    Urinary frequency  - UA Macroscopic with reflex to Microscopic and Culture - Lab Collect  - UA Macroscopic with reflex to Microscopic and  "Culture - Lab Collect    BMI  Estimated body mass index is 27.31 kg/m  as calculated from the following:    Height as of this encounter: 1.59 m (5' 2.6\").    Weight as of this encounter: 69 kg (152 lb 3.2 oz).   Weight management plan: Discussed healthy diet and exercise guidelines      See Patient Instructions    Subjective   Di is a 49 year old, presenting for the following health issues:  Recheck Medication        1/24/2025     7:26 AM   Additional Questions   Roomed by rickey anne   Accompanied by self     Discuss inhaler paying out of pocket and since helping would continue with medication.    History of Present Illness       Reason for visit:  Medication   She is taking medications regularly.     Asthma Follow-Up    Was ACT completed today?  Yes        1/24/2025     7:11 AM   ACT Total Scores   ACT TOTAL SCORE (Goal Greater than or Equal to 20) 17    In the past 12 months, how many times did you visit the emergency room for your asthma without being admitted to the hospital? 0   In the past 12 months, how many times were you hospitalized overnight because of your asthma? 0       Patient-reported     How many days per week do you miss taking your asthma controller medication?  I do not have an asthma controller medication  Please describe any recent triggers for your asthma: cold air  Have you had any Emergency Room Visits, Urgent Care Visits, or Hospital Admissions since your last office visit?  No    Hypertension Follow-up    Do you check your blood pressure regularly outside of the clinic? Yes   Are you following a low salt diet? Yes  Are your blood pressures ever more than 140 on the top number (systolic) OR more   than 90 on the bottom number (diastolic), for example 140/90? No    Had partial Hysterectomy.    -  starting perm have menopausal symptoms; due for PAP smear      Review of Systems  Constitutional, neuro, ENT, endocrine, pulmonary, cardiac, gastrointestinal, genitourinary, musculoskeletal, " "integument and psychiatric systems are negative, except as otherwise noted.      Objective    /84   Pulse 82   Temp 97.8  F (36.6  C) (Temporal)   Resp 17   Ht 1.59 m (5' 2.6\")   Wt 69 kg (152 lb 3.2 oz)   LMP 11/10/2016 (Exact Date)   SpO2 98%   BMI 27.31 kg/m    Body mass index is 27.31 kg/m .  Physical Exam   GENERAL: alert and no distress  RESP: lungs clear to auscultation - no rales, rhonchi or wheezes  CV: regular rate and rhythm, no murmur, click or rub, no peripheral edema  ABDOMEN: soft, nontender, no masses and bowel sounds normal  MS: no gross musculoskeletal defects noted, no edema  NEURO: Normal strength and tone, mentation intact and speech normal  PSYCH: mentation appears normal, affect normal/bright  Results for orders placed or performed in visit on 01/24/25   Comprehensive metabolic panel (BMP + Alb, Alk Phos, ALT, AST, Total. Bili, TP)     Status: Abnormal   Result Value Ref Range    Sodium 142 135 - 145 mmol/L    Potassium 3.7 3.4 - 5.3 mmol/L    Carbon Dioxide (CO2) 25 22 - 29 mmol/L    Anion Gap 11 7 - 15 mmol/L    Urea Nitrogen 11.3 6.0 - 20.0 mg/dL    Creatinine 1.17 (H) 0.51 - 0.95 mg/dL    GFR Estimate 57 (L) >60 mL/min/1.73m2    Calcium 10.0 8.8 - 10.4 mg/dL    Chloride 106 98 - 107 mmol/L    Glucose 121 (H) 70 - 99 mg/dL    Alkaline Phosphatase 87 40 - 150 U/L    AST 23 0 - 45 U/L    ALT 32 0 - 50 U/L    Protein Total 7.3 6.4 - 8.3 g/dL    Albumin 4.2 3.5 - 5.2 g/dL    Bilirubin Total 0.4 <=1.2 mg/dL    Patient Fasting > 8hrs? No    Lipid Profile (Chol, Trig, HDL, LDL calc)     Status: Abnormal   Result Value Ref Range    Cholesterol 230 (H) <200 mg/dL    Triglycerides 193 (H) <150 mg/dL    Direct Measure HDL 41 (L) >=50 mg/dL    LDL Cholesterol Calculated 150 (H) <100 mg/dL    Non HDL Cholesterol 189 (H) <130 mg/dL    Patient Fasting > 8hrs? No     Narrative    Cholesterol  Desirable: < 200 mg/dL  Borderline High: 200 - 239 mg/dL  High: >= 240 " mg/dL    Triglycerides  Normal: < 150 mg/dL  Borderline High: 150 - 199 mg/dL  High: 200-499 mg/dL  Very High: >= 500 mg/dL    Direct Measure HDL  Female: >= 50 mg/dL   Male: >= 40 mg/dL    LDL Cholesterol  Desirable: < 100 mg/dL  Above Desirable: 100 - 129 mg/dL   Borderline High: 130 - 159 mg/dL   High:  160 - 189 mg/dL   Very High: >= 190 mg/dL    Non HDL Cholesterol  Desirable: < 130 mg/dL  Above Desirable: 130 - 159 mg/dL  Borderline High: 160 - 189 mg/dL  High: 190 - 219 mg/dL  Very High: >= 220 mg/dL   HIV Antigen Antibody Combo     Status: Normal   Result Value Ref Range    HIV Antigen Antibody Combo Nonreactive Nonreactive   Treponema Abs w Reflex to RPR and Titer     Status: Normal   Result Value Ref Range    Treponema Antibody Total Nonreactive Nonreactive   UA Macroscopic with reflex to Microscopic and Culture - Lab Collect     Status: Normal    Specimen: Urine, Midstream   Result Value Ref Range    Color Urine Yellow Colorless, Straw, Light Yellow, Yellow    Appearance Urine Clear Clear    Glucose Urine Negative Negative mg/dL    Bilirubin Urine Negative Negative    Ketones Urine Negative Negative mg/dL    Specific Gravity Urine >=1.030 1.003 - 1.035    Blood Urine Negative Negative    pH Urine 5.5 5.0 - 7.0    Protein Albumin Urine Negative Negative mg/dL    Urobilinogen Urine 0.2 0.2, 1.0 E.U./dL    Nitrite Urine Negative Negative    Leukocyte Esterase Urine Negative Negative    Narrative    Microscopic not indicated   CBC with platelets and differential     Status: None   Result Value Ref Range    WBC Count 8.0 4.0 - 11.0 10e3/uL    RBC Count 4.51 3.80 - 5.20 10e6/uL    Hemoglobin 13.3 11.7 - 15.7 g/dL    Hematocrit 40.8 35.0 - 47.0 %    MCV 91 78 - 100 fL    MCH 29.5 26.5 - 33.0 pg    MCHC 32.6 31.5 - 36.5 g/dL    RDW 13.1 10.0 - 15.0 %    Platelet Count 295 150 - 450 10e3/uL    % Neutrophils 49 %    % Lymphocytes 41 %    % Monocytes 7 %    % Eosinophils 3 %    % Basophils 0 %    % Immature  Granulocytes 0 %    Absolute Neutrophils 3.9 1.6 - 8.3 10e3/uL    Absolute Lymphocytes 3.2 0.8 - 5.3 10e3/uL    Absolute Monocytes 0.5 0.0 - 1.3 10e3/uL    Absolute Eosinophils 0.3 0.0 - 0.7 10e3/uL    Absolute Basophils 0.0 0.0 - 0.2 10e3/uL    Absolute Immature Granulocytes 0.0 <=0.4 10e3/uL   NEISSERIA GONORRHOEA PCR     Status: None    Specimen: Vagina; Swab   Result Value Ref Range    Neisseria gonorrhoeae Negative Negative    Neisseria gonorrhoeae Specimen Source Vagina    CHLAMYDIA TRACHOMATIS PCR     Status: None    Specimen: Vagina; Swab   Result Value Ref Range    Chlamydia trachomatis Negative Negative    Chlamydia trachomatis Specimen Source Vagina    CBC with platelets and differential     Status: None    Narrative    The following orders were created for panel order CBC with platelets and differential.  Procedure                               Abnormality         Status                     ---------                               -----------         ------                     CBC with platelets and d...[[id number]]                      Final result                 Please view results for these tests on the individual orders.        Signed Electronically by: Amilcar Acosta MD

## 2025-04-03 NOTE — PROGRESS NOTES
4/1  Di Higgins is a 49 y.o. female who presents to the emergency department for evaluation of upper respiratory infectious symptoms. The differential diagnosis that I considered included pneumonia, asthma exacerbation, cardiac ischemia, ACS, CHF, PE, COVID-19, RSV, influenza, other viral upper respiratory infection. The patient's EKG showed no ST elevation. She did have some nonspecific ST-T wave changes but no old EKG to compare it to. She denied any chest pain and her symptoms did not sound anginal in character. She clearly has infectious symptoms with sweats, chills, productive cough. Her initial troponin did return mildly elevated at 57 but a 2-hour troponin was stable at 59. Therefore I suspect it is demand ischemia related to her acute illness and not acute coronary syndrome. Based on our protocol high sensitivity troponin protocol, she should follow-up closely with her primary clinic for cardiac risk stratification. This was discussed with the patient. I also suspect her symptoms are not consistent with a PE. She does not have risk factors. She has diffuse wheezing on arrival that resolved with a DuoNeb. She has no lower extremity edema, no recent immobilization, no prior history of DVT or PE, no hemoptysis.    The patient's labs returned with a normal white count of 6.9, normal hemoglobin. Her basic metabolic panel showed a slightly low potassium of 3.3. Her creatinine was slightly elevated at 1.45 likely consistent with dehydration. Her bicarb was slightly low and anion gap slightly high; her glucose was normal. She had no signs or symptoms to suggest sepsis. Her urinalysis was negative for infection. Her BNP was negative. Her CK was mildly elevated at 254, not consistent with rhabdomyolysis. She did receive a liter of normal saline for her mild renal insufficiency. Her influenza B returned positive which is consistent with her presentation. At this point her breathing had improved, her wheezing had  resolved. She has 100% O2 sats, no signs of secondary infection on her chest x-ray. She felt much better and felt that she could be discharged to home and I think that is reasonable with close follow-up which she thought she would be able to do through her clinic.    Disposition   The patient was discharged. The patient was discharged to home in good and stable condition. She was given prescriptions as below. She should follow-up with her primary clinic in 2 days for repeat CK and creatinine. She should return to the emergency department at any time especially if unable to keep down p.o., increased shortness of breath or wheezing, weakness, chest pain, new or worsening. She was encouraged to increase her water intake.    Discharge Medication List as of 4/1/2025 1:05 PM       START taking these medications   Details   albuterol-ipratropium, conc: 3-0.5mg/3mL, (DUO-NEB) Inhl nebulizer solution Nebulize 3 mL every 4 (four) hours as needed., Disp-90 mL, R-0, e-Prescribe     oseltamivir (TAMIFLU) 75 mg oral capsule Take 1 capsule (75 mg) by mouth twice a day for 5 days., Disp-10 capsule, R-0, e-Prescribe     predniSONE (DELTASONE) 20 mg oral tablet Take 2 tablets (40 mg) by mouth once daily for 5 days., Disp-10 tablet, R-0, e-Prescribe         Diagnosis   ICD-10-CM   1. Influenza B J10.1     2. Moderate asthma with acute exacerbation, unspecified whether persistent J45.901     3. Elevated troponin R79.89   likely demand from systemic illness     4. Elevated CK R74.8   mild     5. Renal insufficiency N28.9

## 2025-04-04 ENCOUNTER — OFFICE VISIT (OUTPATIENT)
Dept: FAMILY MEDICINE | Facility: CLINIC | Age: 50
End: 2025-04-04
Payer: COMMERCIAL

## 2025-04-04 VITALS
TEMPERATURE: 98 F | SYSTOLIC BLOOD PRESSURE: 135 MMHG | HEIGHT: 63 IN | HEART RATE: 87 BPM | BODY MASS INDEX: 25.16 KG/M2 | OXYGEN SATURATION: 98 % | DIASTOLIC BLOOD PRESSURE: 89 MMHG | RESPIRATION RATE: 17 BRPM | WEIGHT: 142 LBS

## 2025-04-04 DIAGNOSIS — R53.83 OTHER FATIGUE: ICD-10-CM

## 2025-04-04 DIAGNOSIS — J10.1 INFLUENZA A: ICD-10-CM

## 2025-04-04 DIAGNOSIS — Z12.4 CERVICAL CANCER SCREENING: ICD-10-CM

## 2025-04-04 DIAGNOSIS — Z12.31 VISIT FOR SCREENING MAMMOGRAM: ICD-10-CM

## 2025-04-04 DIAGNOSIS — R79.89 ELEVATED SERUM CREATININE: ICD-10-CM

## 2025-04-04 DIAGNOSIS — J45.31 MILD PERSISTENT ASTHMA WITH EXACERBATION: ICD-10-CM

## 2025-04-04 DIAGNOSIS — Z09 FOLLOW-UP EXAM AFTER TREATMENT: Primary | ICD-10-CM

## 2025-04-04 DIAGNOSIS — R74.8 ELEVATED CK: ICD-10-CM

## 2025-04-04 DIAGNOSIS — I10 HTN, GOAL BELOW 140/90: ICD-10-CM

## 2025-04-04 LAB
ANION GAP SERPL CALCULATED.3IONS-SCNC: 10 MMOL/L (ref 7–15)
BUN SERPL-MCNC: 12.4 MG/DL (ref 6–20)
CALCIUM SERPL-MCNC: 9.9 MG/DL (ref 8.8–10.4)
CHLORIDE SERPL-SCNC: 104 MMOL/L (ref 98–107)
CK SERPL-CCNC: 310 U/L (ref 26–192)
CREAT SERPL-MCNC: 1.09 MG/DL (ref 0.51–0.95)
EGFRCR SERPLBLD CKD-EPI 2021: 62 ML/MIN/1.73M2
GLUCOSE SERPL-MCNC: 103 MG/DL (ref 70–99)
HCO3 SERPL-SCNC: 28 MMOL/L (ref 22–29)
POTASSIUM SERPL-SCNC: 3.5 MMOL/L (ref 3.4–5.3)
SODIUM SERPL-SCNC: 142 MMOL/L (ref 135–145)

## 2025-04-04 PROCEDURE — 80048 BASIC METABOLIC PNL TOTAL CA: CPT | Performed by: FAMILY MEDICINE

## 2025-04-04 PROCEDURE — 99214 OFFICE O/P EST MOD 30 MIN: CPT | Mod: 25 | Performed by: FAMILY MEDICINE

## 2025-04-04 PROCEDURE — 3075F SYST BP GE 130 - 139MM HG: CPT | Performed by: FAMILY MEDICINE

## 2025-04-04 PROCEDURE — 36415 COLL VENOUS BLD VENIPUNCTURE: CPT | Performed by: FAMILY MEDICINE

## 2025-04-04 PROCEDURE — 82550 ASSAY OF CK (CPK): CPT | Performed by: FAMILY MEDICINE

## 2025-04-04 PROCEDURE — 90471 IMMUNIZATION ADMIN: CPT | Performed by: FAMILY MEDICINE

## 2025-04-04 PROCEDURE — 3079F DIAST BP 80-89 MM HG: CPT | Performed by: FAMILY MEDICINE

## 2025-04-04 PROCEDURE — 90750 HZV VACC RECOMBINANT IM: CPT | Performed by: FAMILY MEDICINE

## 2025-04-04 RX ORDER — HYDROCHLOROTHIAZIDE 12.5 MG/1
12.5 TABLET ORAL DAILY
Qty: 90 TABLET | Refills: 3 | Status: SHIPPED | OUTPATIENT
Start: 2025-04-04

## 2025-04-04 RX ORDER — IPRATROPIUM BROMIDE AND ALBUTEROL SULFATE 2.5; .5 MG/3ML; MG/3ML
1 SOLUTION RESPIRATORY (INHALATION) EVERY 6 HOURS PRN
Qty: 90 ML | Refills: 3 | Status: SHIPPED | OUTPATIENT
Start: 2025-04-04

## 2025-04-04 ASSESSMENT — ASTHMA QUESTIONNAIRES
QUESTION_1 LAST FOUR WEEKS HOW MUCH OF THE TIME DID YOUR ASTHMA KEEP YOU FROM GETTING AS MUCH DONE AT WORK, SCHOOL OR AT HOME: A LITTLE OF THE TIME
ACT_TOTALSCORE: 16
QUESTION_5 LAST FOUR WEEKS HOW WOULD YOU RATE YOUR ASTHMA CONTROL: SOMEWHAT CONTROLLED
QUESTION_2 LAST FOUR WEEKS HOW OFTEN HAVE YOU HAD SHORTNESS OF BREATH: ONCE A DAY
QUESTION_3 LAST FOUR WEEKS HOW OFTEN DID YOUR ASTHMA SYMPTOMS (WHEEZING, COUGHING, SHORTNESS OF BREATH, CHEST TIGHTNESS OR PAIN) WAKE YOU UP AT NIGHT OR EARLIER THAN USUAL IN THE MORNING: ONCE OR TWICE
QUESTION_4 LAST FOUR WEEKS HOW OFTEN HAVE YOU USED YOUR RESCUE INHALER OR NEBULIZER MEDICATION (SUCH AS ALBUTEROL): TWO OR THREE TIMES PER WEEK

## 2025-04-04 NOTE — LETTER
2025    Di Higgins   1975        To Whom it May Concern;    Please excuse Albertluke HUANG Gisela from work/school for a healthcare visit on 2025.    Sincerely,        Amilcar Acosta MD

## 2025-04-04 NOTE — NURSING NOTE
Prior to immunization administration, verified patients identity using patient s name and date of birth. Please see Immunization Activity for additional information.     Screening Questionnaire for Adult Immunization    Are you sick today?   No   Do you have allergies to medications, food, a vaccine component or latex?   No   Have you ever had a serious reaction after receiving a vaccination?   No   Do you have a long-term health problem with heart, lung, kidney, or metabolic disease (e.g., diabetes), asthma, a blood disorder, no spleen, complement component deficiency, a cochlear implant, or a spinal fluid leak?  Are you on long-term aspirin therapy?   No   Do you have cancer, leukemia, HIV/AIDS, or any other immune system problem?   No   Do you have a parent, brother, or sister with an immune system problem?   No   In the past 3 months, have you taken medications that affect  your immune system, such as prednisone, other steroids, or anticancer drugs; drugs for the treatment of rheumatoid arthritis, Crohn s disease, or psoriasis; or have you had radiation treatments?   No   Have you had a seizure, or a brain or other nervous system problem?   No   During the past year, have you received a transfusion of blood or blood    products, or been given immune (gamma) globulin or antiviral drug?   No   For women: Are you pregnant or is there a chance you could become       pregnant during the next month?   No   Have you received any vaccinations in the past 4 weeks?   No     Immunization questionnaire answers were all negative.      Patient instructed to remain in clinic for 15 minutes afterwards, and to report any adverse reactions.     Screening performed by Peggy Roach MA on 4/4/2025 at 11:16 AM.

## 2025-04-04 NOTE — PROGRESS NOTES
Assessment & Plan     Follow-up exam after treatment in ER   - treated for influenza and is improving, needs recheck labs.    Influenza A   - Improving on Tamiflu    Other fatigue   - improving, due to influenza    Mild persistent asthma with exacerbation   Doing well, douneb worked better and would like to have the same refill for nebs  - ipratropium - albuterol 0.5 mg/2.5 mg/3 mL (DUONEB) 0.5-2.5 (3) MG/3ML neb solution  Dispense: 90 mL; Refill: 3    Cervical cancer screening   -  will schedule PAP    Visit for screening mammogram  - MA Screening Bilateral w/ [facility]    Elevated CK   Likely from Myalgia from influenza.  - CK total  - CK total    Elevated serum creatinine   Needs recheck Cr  - Basic metabolic panel  (Ca, Cl, CO2, Creat, Gluc, K, Na, BUN)  - Basic metabolic panel  (Ca, Cl, CO2, Creat, Gluc, K, Na, BUN)    HTN, goal below 140/90  BP well controlled  - hydroCHLOROthiazide 12.5 MG tablet  Dispense: 90 tablet; Refill: 3      MED REC REQUIRED  Post Medication Reconciliation Status: patient was not discharged from an inpatient facility or TCU    See Patient Instructions    Laura Sevilla is a 49 year old, presenting for the following health issues:  [facility] F/U    Russluke Higgins is a 49 y.o. female who presents to the emergency department for evaluation of upper respiratory infectious symptoms.   The differential diagnosis: included pneumonia, asthma exacerbation, cardiac ischemia, ACS, CHF, PE, COVID-19, RSV, influenza, other viral upper respiratory infection.   The patient's EKG showed no ST elevation. She did have some nonspecific ST-T wave changes   Her initial troponin did return mildly elevated at 57 but a 2-hour troponin was stable at 59.    Based on our protocol high sensitivity troponin protocol, she should follow-up closely with her primary clinic for cardiac risk stratification.   She had diffuse wheezing on arrival that resolved with a DuoNeb.     The patient's labs returned with   a  normal white count of 6.9, normal hemoglobin.   Her basic metabolic panel showed a slightly low potassium of 3.3.   Her creatinine was slightly elevated at 1.45 likely consistent with dehydration.    Her influenza B returned positive which is consistent with her presentation.    Disposition   She should follow-up with her primary clinic in 2 days for repeat CK and creatinine.     Discharge Medication List as of 4/1/2025 1:05 PM       START taking these medications   Details   albuterol-ipratropium, conc: 3-0.5mg/3mL, (DUO-NEB) Inhl nebulizer solution Nebulize 3 mL every 4 (four) hours as needed., Disp-90 mL, R-0, e-Prescribe     oseltamivir (TAMIFLU) 75 mg oral capsule Take 1 capsule (75 mg) by mouth twice a day for 5 days., Disp-10 capsule, R-0, e-Prescribe     predniSONE (DELTASONE) 20 mg oral tablet Take 2 tablets (40 mg) by mouth once daily for 5 days., Disp-10 tablet, R-0, e-Prescribe         Diagnosis   ICD-10-CM   1. Influenza B J10.1     2. Moderate asthma with acute exacerbation, unspecified whether persistent J45.901     3. Elevated troponin R79.89   likely demand from systemic illness     4. Elevated CK R74.8   mild     5. Renal insufficiency N28.9         4/4/2025    10:22 AM   Additional Questions   Roomed by Peggy         4/4/2025   Forms   Any forms needing to be completed Yes         4/4/2025    10:22 AM   Patient Reported Additional Medications   Patient reports taking the following new medications Duoneb 3-0.5mg/3ml every 4 hours as needed,     HPI      ED/UC Followup:    Facility:  [facility]   Date of visit: 04/01/2025  Reason for visit: URI  shortness of breath, dizzy and lightheaded  Current Status: tired, coughing up phlegm, SOB has decreased. On tamiflu for positive influenza B     Weight Loss     Wt Readings from Last 4 Encounters:   04/04/25 64.4 kg (142 lb)   01/24/25 69 kg (152 lb 3.2 oz)   06/03/24 73.9 kg (163 lb)   04/15/24 75.6 kg (166 lb 9.6 oz)        Asthma:  "Recovering from exacerbation and influenza.      Elevated Cholesterol:    The 10-year ASCVD risk score (Tiki WORTHINGTON, et al., 2019) is: 3.2%    Values used to calculate the score:      Age: 49 years      Sex: Female      Is Non- : No      Diabetic: No      Tobacco smoker: No      Systolic Blood Pressure: 135 mmHg      Is BP treated: Yes      HDL Cholesterol: 41 mg/dL      Total Cholesterol: 230 mg/dL       6/3/2024    10:11 AM 1/24/2025     7:11 AM 4/4/2025    10:16 AM   ACT Total Scores   ACT TOTAL SCORE (Goal Greater than or Equal to 20) 19 17  16    In the past 12 months, how many times did you visit the emergency room for your asthma without being admitted to the hospital? 0 0 0   In the past 12 months, how many times were you hospitalized overnight because of your asthma? 0 0 0       Patient-reported      Review of Systems  Constitutional, neuro, ENT, endocrine,  cardiac, gastrointestinal, genitourinary, musculoskeletal, integument and psychiatric systems are negative, except as otherwise noted.      Objective    /89   Pulse 87   Temp 98  F (36.7  C) (Temporal)   Resp 17   Ht 1.59 m (5' 2.6\")   Wt 64.4 kg (142 lb)   LMP 11/10/2016 (Exact Date)   SpO2 98%   BMI 25.48 kg/m    Body mass index is 25.48 kg/m .  Physical Exam   GENERAL: alert and no distress  NECK: no adenopathy, no asymmetry, masses, or scars  RESP: lungs clear to auscultation - no rales, rhonchi or wheezes  CV: regular rate and rhythm, no murmur, click or rub, no peripheral edema  ABDOMEN: soft, nontender, no masses and bowel sounds normal  MS: no gross musculoskeletal defects noted, no edema        Signed Electronically by: Amilcar Acosta MD    "

## 2025-04-07 ENCOUNTER — PATIENT OUTREACH (OUTPATIENT)
Dept: CARE COORDINATION | Facility: CLINIC | Age: 50
End: 2025-04-07
Payer: COMMERCIAL

## 2025-05-23 NOTE — TELEPHONE ENCOUNTER
Lab Results   Component Value Date    5COL Yellow 05/23/2025    5UAPP Cloudy 05/23/2025    5UGLU Negative 05/23/2025    5UBILI Negative 05/23/2025    5UKET Negative 05/23/2025    5USPG 1.015 05/23/2025    5URBC Negative 05/23/2025    5UPH 7.0 05/23/2025    5UPROT Trace (A) 05/23/2025    5UURP 0.2 05/23/2025    POCTUNITR Negative 05/23/2025    5UWBC Trace (A) 05/23/2025      Sent mychart to complete ACT and schedule recheck.  Hazel Vick RN

## 2025-07-05 ENCOUNTER — HEALTH MAINTENANCE LETTER (OUTPATIENT)
Age: 50
End: 2025-07-05

## 2025-08-03 DIAGNOSIS — J45.40 MODERATE PERSISTENT ASTHMA WITHOUT COMPLICATION: ICD-10-CM

## 2025-08-04 RX ORDER — ALBUTEROL SULFATE 90 UG/1
AEROSOL, METERED RESPIRATORY (INHALATION)
Qty: 18 G | Refills: 5 | Status: SHIPPED | OUTPATIENT
Start: 2025-08-04

## 2025-08-16 ENCOUNTER — HEALTH MAINTENANCE LETTER (OUTPATIENT)
Age: 50
End: 2025-08-16

## (undated) DEVICE — PREP CHLORAPREP 26ML TINTED ORANGE  260815

## (undated) DEVICE — KIT ENDO FIRST STEP DISINFECTANT 200ML W/POUCH EP-4

## (undated) DEVICE — PAD CHUX UNDERPAD 23X24" 7136

## (undated) RX ORDER — FENTANYL CITRATE 50 UG/ML
INJECTION, SOLUTION INTRAMUSCULAR; INTRAVENOUS
Status: DISPENSED
Start: 2023-07-14